# Patient Record
Sex: FEMALE | Race: WHITE | NOT HISPANIC OR LATINO | Employment: FULL TIME | URBAN - METROPOLITAN AREA
[De-identification: names, ages, dates, MRNs, and addresses within clinical notes are randomized per-mention and may not be internally consistent; named-entity substitution may affect disease eponyms.]

---

## 2023-03-23 RX ORDER — METHYLPREDNISOLONE 4 MG/1
TABLET ORAL
Refills: 0 | OUTPATIENT
Start: 2023-03-23

## 2023-04-28 PROBLEM — M41.9 SCOLIOSIS: Status: ACTIVE | Noted: 2023-04-28

## 2023-04-28 PROBLEM — G89.29 CHRONIC PAIN: Status: ACTIVE | Noted: 2023-04-28

## 2023-04-28 PROBLEM — F17.200 TOBACCO DEPENDENCE: Status: ACTIVE | Noted: 2023-04-28

## 2023-04-28 PROBLEM — M54.16 LUMBAR RADICULOPATHY: Status: ACTIVE | Noted: 2023-04-28

## 2023-04-28 PROBLEM — J02.9 ACUTE PHARYNGITIS: Status: ACTIVE | Noted: 2023-04-28

## 2023-04-28 PROBLEM — G89.29 CHRONIC LOW BACK PAIN: Status: ACTIVE | Noted: 2023-04-28

## 2023-04-28 PROBLEM — F33.1 MODERATE EPISODE OF RECURRENT MAJOR DEPRESSIVE DISORDER (MULTI): Status: ACTIVE | Noted: 2023-04-28

## 2023-04-28 PROBLEM — F41.9 ANXIETY: Status: ACTIVE | Noted: 2023-04-28

## 2023-04-28 PROBLEM — K21.9 GERD (GASTROESOPHAGEAL REFLUX DISEASE): Status: ACTIVE | Noted: 2023-04-28

## 2023-04-28 PROBLEM — S32.2XXA FRACTURE OF COCCYX (MULTI): Status: ACTIVE | Noted: 2023-04-28

## 2023-04-28 PROBLEM — M54.50 CHRONIC LOW BACK PAIN: Status: ACTIVE | Noted: 2023-04-28

## 2023-04-28 PROBLEM — N91.5 OLIGOMENORRHEA: Status: ACTIVE | Noted: 2023-04-28

## 2023-04-28 PROBLEM — S39.92XA TAILBONE INJURY: Status: ACTIVE | Noted: 2023-04-28

## 2023-04-28 PROBLEM — M54.2 NECK PAIN: Status: ACTIVE | Noted: 2023-04-28

## 2023-04-28 PROBLEM — M25.551 RIGHT HIP PAIN: Status: ACTIVE | Noted: 2023-04-28

## 2023-04-28 PROBLEM — M54.9 BACK PAIN: Status: ACTIVE | Noted: 2023-04-28

## 2023-04-28 PROBLEM — N39.3 STRESS INCONTINENCE, FEMALE: Status: ACTIVE | Noted: 2023-04-28

## 2023-04-28 PROBLEM — R87.810 ASCUS WITH POSITIVE HIGH RISK HPV CERVICAL: Status: ACTIVE | Noted: 2023-04-28

## 2023-04-28 PROBLEM — N89.8 VAGINAL DISCHARGE: Status: ACTIVE | Noted: 2023-04-28

## 2023-04-28 PROBLEM — M53.3 COCCYDYNIA: Status: ACTIVE | Noted: 2023-04-28

## 2023-04-28 PROBLEM — S32.10XD CLOSED FRACTURE OF SACRUM WITH ROUTINE HEALING: Status: ACTIVE | Noted: 2023-04-28

## 2023-04-28 PROBLEM — R87.610 ASCUS WITH POSITIVE HIGH RISK HPV CERVICAL: Status: ACTIVE | Noted: 2023-04-28

## 2023-04-28 PROBLEM — E28.2 PCOS (POLYCYSTIC OVARIAN SYNDROME): Status: ACTIVE | Noted: 2023-04-28

## 2023-04-28 PROBLEM — M47.816 LUMBAR SPONDYLOSIS: Status: ACTIVE | Noted: 2023-04-28

## 2023-04-28 RX ORDER — IBUPROFEN 800 MG/1
TABLET ORAL
COMMUNITY
Start: 2022-07-11 | End: 2023-11-30 | Stop reason: SDUPTHER

## 2023-04-28 RX ORDER — VALACYCLOVIR HYDROCHLORIDE 500 MG/1
TABLET, FILM COATED ORAL
COMMUNITY
Start: 2016-11-14 | End: 2023-10-04

## 2023-04-28 RX ORDER — AZITHROMYCIN 250 MG/1
TABLET, FILM COATED ORAL
COMMUNITY
Start: 2022-11-14 | End: 2023-11-30 | Stop reason: ALTCHOICE

## 2023-04-28 RX ORDER — GABAPENTIN 100 MG/1
1 CAPSULE ORAL 3 TIMES DAILY
COMMUNITY
Start: 2022-06-20 | End: 2023-11-30 | Stop reason: ALTCHOICE

## 2023-04-28 RX ORDER — ACYCLOVIR 800 MG/1
TABLET ORAL
COMMUNITY
Start: 2022-07-11 | End: 2023-11-30 | Stop reason: ALTCHOICE

## 2023-04-28 RX ORDER — FLUCONAZOLE 150 MG/1
TABLET ORAL
COMMUNITY
Start: 2023-02-02 | End: 2023-11-30 | Stop reason: ALTCHOICE

## 2023-05-03 ENCOUNTER — APPOINTMENT (OUTPATIENT)
Dept: PRIMARY CARE | Facility: CLINIC | Age: 33
End: 2023-05-03

## 2023-05-10 ENCOUNTER — APPOINTMENT (OUTPATIENT)
Dept: PRIMARY CARE | Facility: CLINIC | Age: 33
End: 2023-05-10

## 2023-07-05 ENCOUNTER — APPOINTMENT (OUTPATIENT)
Dept: PRIMARY CARE | Facility: CLINIC | Age: 33
End: 2023-07-05

## 2023-10-19 ENCOUNTER — TELEPHONE (OUTPATIENT)
Dept: PRIMARY CARE | Facility: CLINIC | Age: 33
End: 2023-10-19

## 2023-11-02 ENCOUNTER — APPOINTMENT (OUTPATIENT)
Dept: OBSTETRICS AND GYNECOLOGY | Facility: CLINIC | Age: 33
End: 2023-11-02
Payer: COMMERCIAL

## 2023-11-09 ENCOUNTER — APPOINTMENT (OUTPATIENT)
Dept: PRIMARY CARE | Facility: CLINIC | Age: 33
End: 2023-11-09
Payer: COMMERCIAL

## 2023-11-30 ENCOUNTER — OFFICE VISIT (OUTPATIENT)
Dept: PRIMARY CARE | Facility: CLINIC | Age: 33
End: 2023-11-30
Payer: COMMERCIAL

## 2023-11-30 ENCOUNTER — HOSPITAL ENCOUNTER (OUTPATIENT)
Dept: RADIOLOGY | Facility: HOSPITAL | Age: 33
Discharge: HOME | End: 2023-11-30
Payer: COMMERCIAL

## 2023-11-30 VITALS
HEIGHT: 65 IN | SYSTOLIC BLOOD PRESSURE: 124 MMHG | HEART RATE: 104 BPM | BODY MASS INDEX: 36.02 KG/M2 | TEMPERATURE: 97.9 F | WEIGHT: 216.2 LBS | DIASTOLIC BLOOD PRESSURE: 60 MMHG | OXYGEN SATURATION: 96 %
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
## 2023-11-30 DIAGNOSIS — E28.2 PCOS (POLYCYSTIC OVARIAN SYNDROME): ICD-10-CM

## 2023-11-30 DIAGNOSIS — M54.50 ACUTE BILATERAL LOW BACK PAIN WITHOUT SCIATICA: ICD-10-CM

## 2023-11-30 DIAGNOSIS — S39.92XS INJURY OF COCCYX, SEQUELA: ICD-10-CM

## 2023-11-30 DIAGNOSIS — M54.50 CHRONIC BILATERAL LOW BACK PAIN WITHOUT SCIATICA: ICD-10-CM

## 2023-11-30 DIAGNOSIS — G89.29 CHRONIC BILATERAL LOW BACK PAIN WITHOUT SCIATICA: ICD-10-CM

## 2023-11-30 DIAGNOSIS — Z00.00 ANNUAL PHYSICAL EXAM: ICD-10-CM

## 2023-11-30 DIAGNOSIS — F32.81 PMDD (PREMENSTRUAL DYSPHORIC DISORDER): ICD-10-CM

## 2023-11-30 DIAGNOSIS — S32.2XXS CLOSED FRACTURE OF COCCYX, SEQUELA: ICD-10-CM

## 2023-11-30 DIAGNOSIS — Z00.00 ANNUAL PHYSICAL EXAM: Primary | ICD-10-CM

## 2023-11-30 PROBLEM — N87.9 CERVICAL ATYPISM: Status: ACTIVE | Noted: 2023-04-28

## 2023-11-30 PROCEDURE — 36415 COLL VENOUS BLD VENIPUNCTURE: CPT

## 2023-11-30 PROCEDURE — 73521 X-RAY EXAM HIPS BI 2 VIEWS: CPT | Performed by: RADIOLOGY

## 2023-11-30 PROCEDURE — 82627 DEHYDROEPIANDROSTERONE: CPT

## 2023-11-30 PROCEDURE — 82306 VITAMIN D 25 HYDROXY: CPT

## 2023-11-30 PROCEDURE — 83036 HEMOGLOBIN GLYCOSYLATED A1C: CPT

## 2023-11-30 PROCEDURE — 72220 X-RAY EXAM SACRUM TAILBONE: CPT | Performed by: RADIOLOGY

## 2023-11-30 PROCEDURE — 84402 ASSAY OF FREE TESTOSTERONE: CPT

## 2023-11-30 PROCEDURE — 72100 X-RAY EXAM L-S SPINE 2/3 VWS: CPT | Performed by: RADIOLOGY

## 2023-11-30 PROCEDURE — 72220 X-RAY EXAM SACRUM TAILBONE: CPT

## 2023-11-30 PROCEDURE — 73521 X-RAY EXAM HIPS BI 2 VIEWS: CPT

## 2023-11-30 PROCEDURE — 84443 ASSAY THYROID STIM HORMONE: CPT

## 2023-11-30 PROCEDURE — 72100 X-RAY EXAM L-S SPINE 2/3 VWS: CPT

## 2023-11-30 PROCEDURE — 85027 COMPLETE CBC AUTOMATED: CPT

## 2023-11-30 PROCEDURE — 80061 LIPID PANEL: CPT

## 2023-11-30 PROCEDURE — 80053 COMPREHEN METABOLIC PANEL: CPT

## 2023-11-30 PROCEDURE — 99395 PREV VISIT EST AGE 18-39: CPT | Performed by: NURSE PRACTITIONER

## 2023-11-30 RX ORDER — HYDROXYZINE HYDROCHLORIDE 25 MG/1
TABLET, FILM COATED ORAL
COMMUNITY
Start: 2023-06-28 | End: 2023-11-30 | Stop reason: ALTCHOICE

## 2023-11-30 RX ORDER — CYCLOBENZAPRINE HCL 5 MG
TABLET ORAL
COMMUNITY
Start: 2023-06-28

## 2023-11-30 RX ORDER — ALBUTEROL SULFATE 90 UG/1
AEROSOL, METERED RESPIRATORY (INHALATION)
COMMUNITY
Start: 2023-06-28

## 2023-11-30 RX ORDER — IBUPROFEN 800 MG/1
TABLET ORAL
Qty: 21 TABLET | Refills: 0 | Status: SHIPPED | OUTPATIENT
Start: 2023-11-30

## 2023-11-30 RX ORDER — ETODOLAC 300 MG/1
CAPSULE ORAL
COMMUNITY
Start: 2023-06-28 | End: 2023-11-30 | Stop reason: ALTCHOICE

## 2023-11-30 ASSESSMENT — ENCOUNTER SYMPTOMS
NAUSEA: 0
ENDOCRINE NEGATIVE: 1
HEMATOLOGIC/LYMPHATIC NEGATIVE: 1
PALPITATIONS: 0
ABDOMINAL PAIN: 0
DIARRHEA: 0
DYSURIA: 0
ALLERGIC/IMMUNOLOGIC NEGATIVE: 1
VOMITING: 0
FREQUENCY: 0
CHILLS: 0
CONSTIPATION: 0
SHORTNESS OF BREATH: 0
WOUND: 0
FEVER: 0

## 2023-11-30 ASSESSMENT — PAIN SCALES - GENERAL: PAINLEVEL: 10-WORST PAIN EVER

## 2023-11-30 NOTE — PROGRESS NOTES
I was present with the APRN student who participated in the documentation of this note. I have personally seen and re-examined the patient and performed the medical decision-making components (assessment and plan of care). I have reviewed the APRN student documentation and verified the findings in the note as written with additions or exceptions as stated in the body of this note  Anneliese De Oliveira [facility]

## 2023-11-30 NOTE — PROGRESS NOTES
"Subjective   Patient ID: Ashley Berardinelli is a 33 y.o. female who presents for Annual Exam.    HPI     Pleasant 33 y.o. female and established patient presents for annual exam. Current concerns today include back pain and premenstrual dysphoric disorder.    Back pain - Slipped and fell in the shower on Tuesday night. Twisted her body the wrong way during fall. Has been experiencing \"excruciating\" back pain since and can \"barely move\". Describes as sore and rated 10/10. The pain is located across her entire back with radiation to bilateral hips. She's tried flexeril, ibuprofen, ice, heat, and gentle stretching with no relief. She had a previous fall in February 2022 where she fell on her coccyx. Per physical exam, positive back tenderness and decreased range of motion noted. Reports bilateral upper and lower extremity numbness and tingling. Denies loss of bowel or bladder function or weakness. Ibuprofen 800 mg prescribed for pain. X-rays bilateral hip, lumbar spine, and sacrum/coccyx ordered. Physical therapy ordered to strengthen muscles. Continue gentle stretching and rest. Follow-up in two weeks.    Premenstrual dysphoric disorder -  experiences depression along with feeling angry a week before her period. States she \"doesn't feel like herself\". Once she has her period her symptoms go away. Has a past medical history of anxiety and depression. Previously took lexapro, wellbutrin, and prozac. Denies SI or HI. Referral to psychiatry placed.    PCOS - states she was diagnosed for PCOS by her GYN 9/2022. Not on medication. Denies irregular menses. Testosterone level, HgA1c and DHEA-sulfate ordered.    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        SHIFT IN VAGINAL AUDELIA SUGGESTIVE OF BACTERIAL VAGINOSIS.     Family Hx  Mother - anxiety/depression, bipolar disorder  Father - CAD, type 2 diabetes  Grandmother - depression, bipolar disorder  Sister - depression    Review of Systems   Constitutional:  Negative for chills and fever.   HENT:  Negative for hearing loss.    Eyes:  Negative for visual disturbance.   Respiratory:  Negative for shortness of breath.    Cardiovascular:  Negative for chest pain and palpitations.   Gastrointestinal:  Negative for abdominal pain, constipation, diarrhea, nausea and vomiting.   Endocrine: Negative.    Genitourinary:  Negative for dysuria, frequency and urgency.   Musculoskeletal:         HPI   Skin:  Negative for rash and wound.   Allergic/Immunologic: Negative.    Neurological:         HPI   Hematological: Negative.    Psychiatric/Behavioral:          HPI     Objective   /60 (BP Location: Left arm, Patient Position: Sitting, BP Cuff Size: Large adult)   Pulse 104   Temp 36.6 °C (97.9 °F) (Temporal)   Ht 1.638 m (5' 4.5\")   Wt 98.1 kg (216 lb 3.2 oz)   LMP 11/30/2023 (Exact Date)   SpO2 96%   BMI 36.54 kg/m²     Physical Exam  Constitutional:       Appearance: Normal appearance.   HENT:      Head: Normocephalic and atraumatic.      Right Ear: Tympanic membrane, ear canal and external ear normal.      Left Ear: Tympanic membrane, ear canal and external ear normal.      Nose: Nose normal.      Mouth/Throat:      Mouth: Mucous membranes are dry.      Pharynx: Oropharynx is clear.   Eyes:      Extraocular Movements: Extraocular movements intact.      Pupils: Pupils are equal, round, and reactive to light.   Cardiovascular:      Rate and Rhythm: Normal rate and regular rhythm.      Pulses: Normal pulses.      Heart sounds: Normal heart sounds.   Pulmonary:      Effort: Pulmonary effort is normal.      Comments: Rhonchi RLL  Abdominal:      General: Abdomen is flat. " Bowel sounds are normal.      Palpations: Abdomen is soft.   Musculoskeletal:      Cervical back: Normal range of motion and neck supple.      Lumbar back: Tenderness present. Decreased range of motion.   Skin:     General: Skin is warm and dry.   Neurological:      General: No focal deficit present.      Mental Status: She is alert and oriented to person, place, and time.   Psychiatric:         Mood and Affect: Mood normal.         Assessment/Plan   Problem List Items Addressed This Visit             ICD-10-CM    Annual physical exam - Primary Z00.00    Relevant Orders    CBC    Comprehensive Metabolic Panel    Vitamin D 25-Hydroxy,Total (for eval of Vitamin D levels)    TSH with reflex to Free T4 if abnormal    Hemoglobin A1C    Lipid Panel    XR lumbar spine 2-3 views    XR sacrum coccyx 2+ views    Back pain M54.9     Continue gentle stretching and rest         Relevant Medications     mg tablet    Other Relevant Orders    XR lumbar spine 2-3 views    XR sacrum coccyx 2+ views    Referral to Physical Therapy    Fall at home W19.XXXA, Y92.009    Relevant Medications     mg tablet    Other Relevant Orders    XR lumbar spine 2-3 views    XR sacrum coccyx 2+ views    Fracture of coccyx (CMS/HCC) S32.2XXA    Relevant Orders    XR sacrum coccyx 2+ views    PCOS (polycystic ovarian syndrome) E28.2    Relevant Orders    Testosterone, total and free    DHEA-Sulfate    Tailbone injury S39.92XA    Relevant Orders    XR sacrum coccyx 2+ views     Other Visit Diagnoses         Codes    PMDD (premenstrual dysphoric disorder)     F32.81    Relevant Orders    Referral to Psychiatry

## 2023-11-30 NOTE — LETTER
November 30, 2023     Patient: Ashley Berardinelli   YOB: 1990   Date of Visit: 11/30/2023       To Whom It May Concern:    Ashley Berardinelli was seen in my clinic on 11/30/2023 at 1:30 pm. Please excuse Gean for her absence from work for today 11/30/23 and 12/01/2023.    If you have any questions or concerns, please don't hesitate to call.         Sincerely,         Anneliese De Oliveira, LAKHWINDER-CNP        CC: No Recipients

## 2023-11-30 NOTE — LETTER
November 30, 2023     Patient: Ashley Berardinelli   YOB: 1990   Date of Visit: 11/30/2023       To Whom It May Concern:    Ashley Berardinelli was seen in my clinic on 11/30/2023 at 1:30 pm. Please excuse Gena for her absence from work on this day to make the appointment.    If you have any questions or concerns, please don't hesitate to call.         Sincerely,         Anneliese De Oliveira, APRN-CNP        CC: No Recipients

## 2023-11-30 NOTE — PATIENT INSTRUCTIONS
PT referral  BH referral    A prescription was sent to your pharmacy. Your pharmacist can answer any questions or concerns you may have or you may call the office.    Health Tips      Develop good health habits now  Don't put it off. With good health habits, you can prevent or reduce the likelihood of health-impacting conditions later in life, such as obesity, high blood pressure, heart disease, or diabetes. Establishing good health habits now is easier than having to begin these practices later on, or to have to break bad habits.      Establish a relationship with a primary care provider   A PCP can help you on your health journey. When you see a provider on a regular basis, you're more likely to feel comfortable about talking about sensitive issues as well as being receptive to the advice your provider offers, because you develop a trusting relationship. And by getting to know you over time, your doctor may be better able to  on signs of health concerns.      Know your family health history   Knowing your family's health history can help you and your primary care provider better manage your health - and be aware of potential hereditary risks to be watching for.  Things like migraines or even family members with certain cancers and heart attack can increase your risk.       Get regular health screenings and Keep up with immunizations. This includes the HPV vaccine, for men and women.   For women: Monthly Self breast exams, See Gynecology for a Pap smear; HPV screening for the virus that causes genital warts, which can lead to cervical cancer   For men: Monthly Self testicular exams.   For both: sexually transmitted disease testing, if sexually active. Remember to use birth control to prevent and to protect. Talk with your health care provider about the screening schedule that's best for you.    Have good for you people in your life  Its all about a few good friends over a lot of not so good friends. If you  are close to your family stay that way, if not then develop new relationships that help you to grow and thrive. Often people make friends at work, Taoism, community groups  Developing and maintaining a work-life balance that allows room for friendships and relationships can have a positive impact on your mental and emotional health.     Be a numbers person  Keep tabs on numbers that affect your health, like weight, blood pressure, the amount of calories you consume, and cholesterol. Your health care provider can help you with this.      Pay attention to the risk of a few extra pounds.  If you gain four or five pounds every year, it doesn't seem like a lot, but at the end of 10 years, you've added 40 or 50 pounds - and in 15 to 20 years, you have 75 to 100 extra pounds that you're carrying!  *Choose a life of healthful eating over trendy diets. The more effective approach: adopt a life-time practice of eating a balanced, nutritional diet that includes vegetables, fruits, lean meats, whole grains, low-fat dairy, nuts and legumes, and non-tropical vegetable oils. And limit sweets.   *Practice portion control. There's more to healthy eating than choosing nutritious food. There's also limiting how much you eat, even if you're eating incredibly healthy food *Remember, your metabolic rate slows as you age. That is, your body becomes less efficient in burning calories.     Stay active   If you're exercising on a regular basis, that is going to help with a lot of health problems that are related to lifestyle.  You don't have to be an athlete, start with a walking program. Even 10 minutes of good exercise is beneficial. Don't forget weight training. Any Weights 3 days a week maintains bone health and helps to burn calories    Get enough sleep  For most that means seven to nine hours a night.   Sleep affects your ability to learn new information and to memorize and process information. Reaction time is adversely affected by  too little sleep (a big safety risk similar to drinking alcohol). In the long run, sleep makes a big difference in how you function and succeed     Mood impacts your overall health  People who are struggling with depression, anxiety, and self-esteem issues really have a lot of difficulty with their health. When depressed, you may not be motivated and may not see the value of taking care of your health. Self Care, Exercise and friendships can help reduce your risk of mental and emotional health issues, and when you need it, your health care provider can help you get professional help.      Don't vape  Vaping is a big problem ,it's not just flavored water, nicotine comes from tobacco so you are in essence still smoking. Also, we don't know about the effects of what's in vaping cartridges, and sometimes they're modified with substances that can cause lung failure. Cigarettes are even worse with known cancer causing chemicals  2 ml of vape juice is equal to 1 pack of cigarettes    Think twice about marijuana  Even in states where marijuana is legal (medically or recreationally) it doesn't mean your employer is going to think it's OK.   Like alcohol, marijuana affects your reasoning, decision-making, and ability to operate equipment safely
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
## 2023-12-01 DIAGNOSIS — R79.89 ELEVATED TESTOSTERONE LEVEL IN FEMALE: ICD-10-CM

## 2023-12-01 DIAGNOSIS — M54.50 ACUTE BILATERAL LOW BACK PAIN WITHOUT SCIATICA: ICD-10-CM
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
## 2023-12-01 LAB
25(OH)D3 SERPL-MCNC: 32 NG/ML (ref 30–100)
ALBUMIN SERPL BCP-MCNC: 4.2 G/DL (ref 3.4–5)
ALP SERPL-CCNC: 73 U/L (ref 33–110)
ALT SERPL W P-5'-P-CCNC: 20 U/L (ref 7–45)
ANION GAP SERPL CALC-SCNC: 13 MMOL/L (ref 10–20)
AST SERPL W P-5'-P-CCNC: 14 U/L (ref 9–39)
BILIRUB SERPL-MCNC: 0.3 MG/DL (ref 0–1.2)
BUN SERPL-MCNC: 9 MG/DL (ref 6–23)
CALCIUM SERPL-MCNC: 9.3 MG/DL (ref 8.6–10.6)
CHLORIDE SERPL-SCNC: 106 MMOL/L (ref 98–107)
CHOLEST SERPL-MCNC: 163 MG/DL (ref 0–199)
CHOLESTEROL/HDL RATIO: 2.4
CO2 SERPL-SCNC: 25 MMOL/L (ref 21–32)
CREAT SERPL-MCNC: 0.78 MG/DL (ref 0.5–1.05)
DHEA-S SERPL-MCNC: 240 UG/DL (ref 12–379)
ERYTHROCYTE [DISTWIDTH] IN BLOOD BY AUTOMATED COUNT: 13.2 % (ref 11.5–14.5)
EST. AVERAGE GLUCOSE BLD GHB EST-MCNC: 105 MG/DL
GFR SERPL CREATININE-BSD FRML MDRD: >90 ML/MIN/1.73M*2
GLUCOSE SERPL-MCNC: 101 MG/DL (ref 74–99)
HBA1C MFR BLD: 5.3 %
HCT VFR BLD AUTO: 39.3 % (ref 36–46)
HDLC SERPL-MCNC: 67 MG/DL
HGB BLD-MCNC: 12.6 G/DL (ref 12–16)
LDLC SERPL CALC-MCNC: 84 MG/DL
MCH RBC QN AUTO: 29.8 PG (ref 26–34)
MCHC RBC AUTO-ENTMCNC: 32.1 G/DL (ref 32–36)
MCV RBC AUTO: 93 FL (ref 80–100)
NON HDL CHOLESTEROL: 96 MG/DL (ref 0–149)
NRBC BLD-RTO: 0 /100 WBCS (ref 0–0)
PLATELET # BLD AUTO: 295 X10*3/UL (ref 150–450)
POTASSIUM SERPL-SCNC: 3.9 MMOL/L (ref 3.5–5.3)
PROT SERPL-MCNC: 6.5 G/DL (ref 6.4–8.2)
RBC # BLD AUTO: 4.23 X10*6/UL (ref 4–5.2)
SODIUM SERPL-SCNC: 140 MMOL/L (ref 136–145)
TRIGL SERPL-MCNC: 58 MG/DL (ref 0–149)
TSH SERPL-ACNC: 0.56 MIU/L (ref 0.44–3.98)
VLDL: 12 MG/DL (ref 0–40)
WBC # BLD AUTO: 7.1 X10*3/UL (ref 4.4–11.3)

## 2023-12-01 RX ORDER — METHYLPREDNISOLONE 4 MG/1
TABLET ORAL
Qty: 21 TABLET | Refills: 0 | Status: SHIPPED | OUTPATIENT
Start: 2023-12-01 | End: 2023-12-08

## 2023-12-04 PROBLEM — R79.89 ELEVATED TESTOSTERONE LEVEL IN FEMALE: Status: ACTIVE | Noted: 2023-12-04

## 2023-12-04 LAB
TESTOSTERONE FREE (CHAN): 6.7 PG/ML (ref 0.1–6.4)
TESTOSTERONE,TOTAL,LC-MS/MS: 45 NG/DL (ref 2–45)

## 2023-12-05 ENCOUNTER — APPOINTMENT (OUTPATIENT)
Dept: OBSTETRICS AND GYNECOLOGY | Facility: CLINIC | Age: 33
End: 2023-12-05
Payer: COMMERCIAL

## 2023-12-07 ENCOUNTER — OFFICE VISIT (OUTPATIENT)
Dept: NEUROSURGERY | Facility: CLINIC | Age: 33
End: 2023-12-07
Payer: COMMERCIAL

## 2023-12-07 VITALS
WEIGHT: 215 LBS | HEIGHT: 64 IN | HEART RATE: 92 BPM | DIASTOLIC BLOOD PRESSURE: 93 MMHG | BODY MASS INDEX: 36.7 KG/M2 | RESPIRATION RATE: 20 BRPM | SYSTOLIC BLOOD PRESSURE: 139 MMHG

## 2023-12-07 DIAGNOSIS — M54.50 ACUTE BILATERAL LOW BACK PAIN, UNSPECIFIED WHETHER SCIATICA PRESENT: ICD-10-CM

## 2023-12-07 PROCEDURE — 99203 OFFICE O/P NEW LOW 30 MIN: CPT | Performed by: NEUROLOGICAL SURGERY

## 2023-12-07 PROCEDURE — 99213 OFFICE O/P EST LOW 20 MIN: CPT | Performed by: NEUROLOGICAL SURGERY

## 2023-12-07 RX ORDER — CYCLOBENZAPRINE HCL 10 MG
10 TABLET ORAL 2 TIMES DAILY PRN
Qty: 60 TABLET | Refills: 0 | Status: SHIPPED | OUTPATIENT
Start: 2023-12-07 | End: 2024-01-06

## 2023-12-07 ASSESSMENT — PAIN SCALES - GENERAL: PAINLEVEL: 10-WORST PAIN EVER

## 2023-12-07 NOTE — PROGRESS NOTES
It was a pleasure to see Ms. Berardinelli at the Neurosurgery [facility] at [facility].     She is a really nice 33 y.o. female  who presents to us with complaints LOW BACK PAIN radiating to RIGHT LEG and left leg that started about  1 week ago, and have been rapidly worsening since that time.  The symptoms started after a fall    The pain is 10 /10. The pain is described as aching, burning, and stabbing and occurs all day.  Symptoms are exacerbated by nothing in particular. Factors which relieve the pain include  lying down       Numbness and/or tingling - YES    Weakness : YES    Bladder/Bowel symptoms - NO    The patient has tried medications (eg: gabapentin, NSAIDS and narcotics ) : Yes  PT : No  Pain Management with ESIs/selective nerve blocks  - NO    she is a is a smoker    History of Depression : YES    PRIOR SPINE SURGERY: NO    Denies use of Aspirin, Coumadin, or Plavix or any other anticoagulants     NARCOTICS for pain : NO    Part of this patient’s history is from personal review of the patient’s previous charts.      Past Medical History:   Diagnosis Date    Difficulty in walking, not elsewhere classified 11/16/2020    Difficulty walking    Lumbago with sciatica, unspecified side 11/16/2020    Lumbago with sciatica    Obesity, unspecified 12/15/2013    Obesity    Personal history of other diseases of the nervous system and sense organs     History of chronic fatigue syndrome    Personal history of other infectious and parasitic diseases 07/05/2017    History of herpes zoster    Personal history of other specified conditions 07/21/2020    History of syncope           Current Outpatient Medications:     cyclobenzaprine (Flexeril) 5 mg tablet, Take by mouth., Disp: , Rfl:      mg tablet, TAKE ONE TABLET BY MOUTH THREE TIMES DAILY FOR 7 DAYS, Disp: 21 tablet, Rfl: 0    methylPREDNISolone (Medrol Dospak) 4 mg tablets, Take as directed on package., Disp: 21 tablet, Rfl: 0    valACYclovir  (Valtrex) 500 mg tablet, TAKE 1 TABLET BY MOUTH TWICE DAILY FOR 3 DAYS FOR RECURRENT INFECTION. START AS SOON AS POSSIBLE WITHIN 48 TO 72 HOURS OF ONSET OF SYMPTOMS, Disp: 30 tablet, Rfl: 0    Ventolin HFA 90 mcg/actuation inhaler, Inhale., Disp: , Rfl:       Review of Systems :   Constitutional: No fever or chills  Respiratory: No shortness of breath or wheezing  Musculoskeletal: see HPI above   Neurologic: See HPI above      EXAM:   There were no vitals filed for this visit.  NEURO: Neurologically patient is awake alert and oriented X 3    No obvious cranial nerve deficit.  Strength is almost 5/5 throughout all motor groups tested with no asymmetric significant motor deficit.  Deep tendon reflexes are symmetric throughout.   Gait : WNL   Sensory examination is intact to touch and painful stimuli.  Range of motion is diminished secondary to pain.    IMAGING:   AP and lateral x-ray of the lumbar spine done on 11/30/2023 were reviewed personally and shows no evidence of any fracture or subluxation.  MRI of the lumbar spine done in 2022 was also reviewed and demonstrates presence of age related degenerative changes with NO significant focal central or foraminal stenosis leading to nerve root compression.      ASSESSMENT AND PLAN:  Ms. Berardinelli has not tried any conservative treatment yet.   Her neurological examination is completely benign with absence of any focal neurological deficits.  The duration of symptoms < 6 weeks and the patient does NOT have any symptoms or history of severe or progressive neurological deficit and there is no urgent indication to obtain any advanced imaging.  In fact she had an MRI in June 2022 that did not showed any obvious pathology that would require any kind of surgery.  I reassured her that her symptoms seems to be musculoskeletal secondary to the fall.  I have given her a referral for PT and also a prescription for muscle relaxants that may help her with her symptoms.  She can  continue NSAIDs.     I will be happy to see her back if she continues to be symptomatic despite all conservative treatment to obtain advanced imaging.      Galo Kruger MD, [facility]   of Neurological Surgery  [employer]  Attending Surgeon  Director - Minimally Invasive Spine Surgery  [place], [place]      Some of this note was completed using Dragon voice recognition technology and sometimes the software misinterprets words. This may include unintended errors with respect to translation of words, typographical errors or grammar errors which may not have been identified prior to finalization of the chart note. Please take this into account when reading this note

## 2023-12-08 ENCOUNTER — EVALUATION (OUTPATIENT)
Dept: PHYSICAL THERAPY | Facility: CLINIC | Age: 33
End: 2023-12-08
Payer: COMMERCIAL

## 2023-12-08 DIAGNOSIS — G89.29 CHRONIC BILATERAL LOW BACK PAIN WITHOUT SCIATICA: ICD-10-CM

## 2023-12-08 DIAGNOSIS — M54.50 CHRONIC BILATERAL LOW BACK PAIN WITHOUT SCIATICA: ICD-10-CM

## 2023-12-08 PROCEDURE — 97161 PT EVAL LOW COMPLEX 20 MIN: CPT | Mod: GP | Performed by: INTERNAL MEDICINE

## 2023-12-08 NOTE — PROGRESS NOTES
Physical Therapy    Physical Therapy Evaluation and Treatment    Patient Name: Ashley Berardinelli  MRN: [id number]  Today's Date: 12/8/2023  Time Calculation  Start Time: 1428  Stop Time: 1503  Time Calculation (min): 35 min    Insurance reviewed   Visit number: 1  30 PT/OT V PCY; 0 used  No authorization required after evaluation  $ 25 copay     Referring Physician: Dr. Galo Kurger    Assessment:  PT Assessment Results: Decreased strength, Decreased range of motion, Decreased endurance, Decreased mobility, Pain  Rehab Prognosis: Good  Evaluation/Treatment Tolerance: Patient tolerated treatment well    Ashley Berardinelli  is a 33 y.o. old patient who participated in a physical therapy evaluation today due to LBP with radiating pain to B hips and groin. Her impairments include: postural deficits, tenderness, strength deficits, pain, and ROM deficits. Due to these impairments, she has the following functional limitations and participation restrictions: difficulty with sleeping (without medication), stair negotiation, transfers, performing household/recreational/occupational activities, and performing some ADLS. Skilled physical therapy services are appropriate and beneficial in order to achieve measurable and meaningful change in the above objective tests and measures. Utilization of skilled physical therapy services will aid in advancing her functional status and attaining her therapy-related goals. The patient verbalized understanding and is in agreement with all goals and plan of care.  Plan of care was developed with input and agreement by the patient    Plan:  Treatment/Interventions: Aquatic therapy, Cryotherapy, Dry needling, Education/ Instruction, Electrical stimulation, Manual therapy, Mechanical traction, Neuromuscular re-education, Self care/ home management, Taping techniques, Therapeutic activities, Therapeutic exercises, Ultrasound  PT Plan: Skilled PT  PT Frequency: 1 time per week  Duration: 6  visits  Rehab Potential: Good  Plan of Care Agreement: Patient      Current Problem:  1. Chronic bilateral low back pain without sciatica  Referral to Physical Therapy        Subjective    Ashley Berardinelli  is a 33 y.o. female  presenting to the clinic with chief complaint of LBP starting about one year ago when she fell on her tail bone; completed therapy for 9 months with good relief and results. Recently, pt reports she twisted her back to the L after slipping when getting in to shower. Pt reports finishing steroids. Trying to stretch via seated reach and cat/cow. Pt had sciatica but became worse after fall    Ashley Berardinelli  reports symptoms began 11/30 when slipping and almost falling getting in to shower    Pain / Location: 7/10 low back, hip and B groin pain     Reports symptoms are better with laying supine and resting, muscle relaxer, ibuprofen    Reports symptoms are worse with prolonged sitting, standing    Radiating Pain:  Yes; to B hips and groin  Numbness/Tingling: No concerns    Functional Limitations:  personal care ADLs, lifting, walking, sitting, standing, sleeping, social life, traveling, employment/homemaking    CURRENT MEDICAL MANAGEMENT:    Medical History Form: Reviewed (scanned into chart)  -Imaging: XR lumbar spine 2-3 views, XR sacrum coccyx 2+ views, XR hips bilateral 2 VW w pelvis when performed 11/30/23: Sacroiliac joints normal without erosive change or ankylosis. Sacrum and coccyx normal without fracture or lesion. The lumbar spine demonstrates mild degenerative change at L3-4,  similar to prior exam. No fracture seen. Bilateral hip joints unremarkable.      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 Likes to dance and sing  Living Environment: [address] with HRs. Tub shower with towel bar  Sleep: No concerns  with muscle relaxer    Precautions:   Precautions  Precautions Comment: +Sciatica; not medically managed. +OA; not medically managed. (Low Fall Risk; educated increased risk based on hx)      Objective     Outcome Measures:  Other Measures  Oswestry Disablity Index (DMITRY): 60%    Observation: pain with proximal hip MMT. Leg length discrepancy with LLE longer than RLE  Posture: fair, forward head, rounded shoulders  Correction of posture:  sx's worse    Dermatomes/Sensation Testing:  (L2) Anterior Thigh:  intact  (L3) Medial Knee:  intact  (L4) Medial Malleolus:  intact  (L5) Dorsal Second Toe Web Space: intact  (S1) Lateral Malleolus:  intact    Myotomes/Strength Testing (MMT in sitting):  (L2) Hip Flex (R/L):  4/5 p!, 4+/5 p! (Pain R > L)   (L3) Knee Ext (R/L): 5/5, 5/5  Knee Flex (R/L): 5/5, 5/5  (L4) Ankle DF (R/L): 4+/5, 4+/5  (L5) GTE (R/L): 4+/5, 4+/5  (S1) Ankle PF (R/L):  5/5, 5/5    Hip Abd. (R/L): 4/5 p! , 4/5 p!  Hip Ext. (Observed with functional bridge): good lift of buttocks from mat table    Special Tests:  Slump R/L:  neg / neg  Active SLR R/L: neg/ neg    Lumbar ROM  FIS: mild limitation; sx's worse  EIS: mod to significant limitation; sx's worse (worse with extension)    Trunk SB R/L: min limitation / mod limitation   Trunk Rotation R/L: NT B due to mechanism of injury    Palpation: tenderness to upper, mid, and lower spinous processes and paraspinals, B greater trochanters      KEY  NT = not tested this visit  p! = pain  ~ = approximation          Treatments:  Eval/TX, HEP, Safety Education:   Pt was educated on HEP, anatomy and function, examination findings, POC, and goals of therapy. HEP was reviewed with pt this visit and included all exercises performed below. Printout of exercises were provided to pt at end of session with all questions and concerns answered prior to pt leaving today's evaluation.    Therapeutic Exercise x 5 minutes, 0 unit   Access Code: [email or web address]  - Pelvic Muscle Energy  Technique With Flexion and Extension  - 1 x daily - 7 x weekly - 1 sets - 10 reps - 10 second hold  - Supine Hip Adduction Isometric with Ball  - 1 x daily - 7 x weekly - 3 sets - 10 reps  - Supine Lower Trunk Rotation  - 1 x daily - 7 x weekly - 3 sets - 10 reps    NV: monitor response to pelvic MET, back school, progress proximal hip and core strength as dale    OP EDUCATION:   23 1400   Outpatient Education   Individual(s) Educated Patient   Education Provided Home Exercise Program;POC;Other   Patient/Caregiver Demonstrated Understanding yes   Plan of Care Discussed and Agreed Upon yes   Patient Response to Education Patient/Caregiver Verbalized Understanding of Information;Patient/Caregiver Performed Return Demonstration of Exercises/Activities;Patient/Caregiver Asked Appropriate Questions       Goals:  ST weeks  1. Patient independent with home exercise program to progress current functional status    LTGs: 6 weeks  1.  Improve Oswestry score to 0-19% impairment to indicate a significant improvement in overall lumbar perception of disability.  2.  Decrease complaints of pain by 80% at minimum of evaluation rating, 4 of 7 days a week at minimum.  3. Patient will demonstrate improvements in sitting and standing posture without cueing from therapist during 45 minute session to allow for improved tolerances for spinal loading.  4.  Patient will demonstrate appropriate and safe body mechanics with work related and/or clinical activities to manage pain and prevent further injury  5.  Increase LE strength by 0.5 to 1 manual testing grade to improve overall functional mobility for IADLS and postural control.    6.  Increase spine AROM to </= mild limitations without pain as appropriate in each plane, to improve functional mobility tolerances for IADLS.

## 2023-12-13 ENCOUNTER — DOCUMENTATION (OUTPATIENT)
Dept: PHYSICAL THERAPY | Facility: CLINIC | Age: 33
End: 2023-12-13
Payer: COMMERCIAL

## 2023-12-13 ENCOUNTER — APPOINTMENT (OUTPATIENT)
Dept: PHYSICAL THERAPY | Facility: CLINIC | Age: 33
End: 2023-12-13
Payer: COMMERCIAL

## 2023-12-13 NOTE — PROGRESS NOTES
Physical Therapy                    Therapy Communication Note    Patient Name: Ashley Berardinelli  MRN: [id number]  Today's Date: 12/13/2023     Discipline: Physical Therapy    Missed Visit Reason: Per chart review, pt canceled PT session this afternoon via MyChart    Missed Time: Cancel

## 2023-12-14 ENCOUNTER — APPOINTMENT (OUTPATIENT)
Dept: PRIMARY CARE | Facility: CLINIC | Age: 33
End: 2023-12-14
Payer: COMMERCIAL

## 2023-12-18 ENCOUNTER — DOCUMENTATION (OUTPATIENT)
Dept: PHYSICAL THERAPY | Facility: CLINIC | Age: 33
End: 2023-12-18
Payer: COMMERCIAL

## 2023-12-18 NOTE — PROGRESS NOTES
Physical Therapy                    Therapy Communication Note    Patient Name: Ashley Berardinelli  MRN: [id number]  Today's Date: 12/18/2023     Discipline: Physical Therapy    Missed Visit Reason: Pt no call, no show for PT session this morning. Called and left pt VM with reminder of next session on 01/04/24 and cancellation/no show policy. Pt 2/3 for cancellations/no shows at this time.    Missed Time: No Show

## 2023-12-26 ENCOUNTER — APPOINTMENT (OUTPATIENT)
Dept: OBSTETRICS AND GYNECOLOGY | Facility: CLINIC | Age: 33
End: 2023-12-26
Payer: COMMERCIAL

## 2024-01-02 ENCOUNTER — APPOINTMENT (OUTPATIENT)
Dept: PRIMARY CARE | Facility: CLINIC | Age: 34
End: 2024-01-02
Payer: COMMERCIAL

## 2024-01-04 ENCOUNTER — DOCUMENTATION (OUTPATIENT)
Dept: PHYSICAL THERAPY | Facility: CLINIC | Age: 34
End: 2024-01-04
Payer: COMMERCIAL

## 2024-01-04 NOTE — PROGRESS NOTES
Physical Therapy                 Therapy Communication Note    Patient Name: Ashley Berardinelli  MRN: [id number]  Today's Date: 1/4/2024     Discipline: Physical Therapy    Missed Visit Reason:      Missed Time: No Show    Comment: Discharge due to non compliance.  Pt was informed in our cancellation/no show policy on last no show 12/18/23.  Pt not seen in PT since 12/8/23 initial evaluation.

## 2024-01-11 ENCOUNTER — APPOINTMENT (OUTPATIENT)
Dept: PHYSICAL THERAPY | Facility: CLINIC | Age: 34
End: 2024-01-11
Payer: COMMERCIAL

## 2024-01-19 ENCOUNTER — APPOINTMENT (OUTPATIENT)
Dept: OBSTETRICS AND GYNECOLOGY | Facility: CLINIC | Age: 34
End: 2024-01-19
Payer: COMMERCIAL

## 2024-02-16 ENCOUNTER — APPOINTMENT (OUTPATIENT)
Dept: OBSTETRICS AND GYNECOLOGY | Facility: CLINIC | Age: 34
End: 2024-02-16
Payer: COMMERCIAL

## 2024-04-01 ENCOUNTER — LAB REQUISITION (OUTPATIENT)
Dept: LAB | Facility: HOSPITAL | Age: 34
End: 2024-04-01
Payer: COMMERCIAL

## 2024-04-01 DIAGNOSIS — U07.1 COVID-19: ICD-10-CM

## 2024-04-01 LAB — SARS-COV-2 RNA RESP QL NAA+PROBE: NOT DETECTED

## 2024-04-01 PROCEDURE — 87635 SARS-COV-2 COVID-19 AMP PRB: CPT

## 2024-04-09 ENCOUNTER — OFFICE VISIT (OUTPATIENT)
Dept: OBSTETRICS AND GYNECOLOGY | Facility: CLINIC | Age: 34
End: 2024-04-09
Payer: COMMERCIAL

## 2024-04-09 VITALS — BODY MASS INDEX: 36.65 KG/M2 | WEIGHT: 220 LBS | HEIGHT: 65 IN

## 2024-04-09 DIAGNOSIS — E28.2 PCOS (POLYCYSTIC OVARIAN SYNDROME): ICD-10-CM

## 2024-04-09 DIAGNOSIS — Z30.011 ENCOUNTER FOR INITIAL PRESCRIPTION OF CONTRACEPTIVE PILLS: ICD-10-CM

## 2024-04-09 DIAGNOSIS — R87.810 ASCUS WITH POSITIVE HIGH RISK HPV CERVICAL: Primary | ICD-10-CM

## 2024-04-09 DIAGNOSIS — Z01.419 WELL WOMAN EXAM: ICD-10-CM

## 2024-04-09 DIAGNOSIS — N94.3 PMS (PREMENSTRUAL SYNDROME): ICD-10-CM

## 2024-04-09 DIAGNOSIS — R87.610 ASCUS WITH POSITIVE HIGH RISK HPV CERVICAL: Primary | ICD-10-CM

## 2024-04-09 PROCEDURE — 87624 HPV HI-RISK TYP POOLED RSLT: CPT

## 2024-04-09 PROCEDURE — 99385 PREV VISIT NEW AGE 18-39: CPT | Performed by: OBSTETRICS & GYNECOLOGY

## 2024-04-09 PROCEDURE — 88141 CYTOPATH C/V INTERPRET: CPT | Performed by: PATHOLOGY

## 2024-04-09 PROCEDURE — 88175 CYTOPATH C/V AUTO FLUID REDO: CPT

## 2024-04-09 RX ORDER — DROSPIRENONE AND ETHINYL ESTRADIOL 0.02-3(28)
1 KIT ORAL DAILY
Qty: 84 TABLET | Refills: 0 | Status: SHIPPED | OUTPATIENT
Start: 2024-04-09 | End: 2025-04-09

## 2024-04-09 NOTE — PROGRESS NOTES
"Ashley Berardinelli is a 33 y.o. female who is here for a routine exam. 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  H/o PCOS, elevated testosterone  PMS/PMDD sx  acne    Menses : monthly  Contraception : other none  HPV vaccine : No  Last pap : 2022 ascus  Last HPV : 2022 +  History of abnormal pap : Yes, describe: colpo neg  Last mammogram : never  History of abnormal mammogram : No  Colon cancer screen : never    ROS  systems reviewed, anything negative noted in HPI    bladder: no dysuria, gross hematuria, urinary frequency, urgency or incontinence  breast: no lumps, nipple d/c, overlying skin changes, redness, or skin retraction    [unfilled]    Past med hx and past surg hx reviewed and notable for: PCOS    Objective   Ht 1.638 m (5' 4.5\")   Wt 99.8 kg (220 lb)   LMP 03/25/2024   BMI 37.18 kg/m²      General:   Alert and oriented, in no acute distress   Neck: Supple. No visible thyromegaly.    Breast/Axilla: Normal to palpation bilaterally without masses, skin changes, lymphadenopathy, or nipple discharge.    Abdomen: Soft, non-tender, without masses or organomegaly   Vulva: Normal architecture without erythema, masses, or lesions.    Vagina: Normal mucosa without lesions, masses, or atrophy. No abnormal vaginal discharge.    Cervix: Normal without masses, lesions, or signs of cervicitis.    Uterus: Normal mobile, non-enlarged uterus    Adnexa: Normal without masses or lesions   Pelvic Floor No POP noted.    Psych Normal affect. Normal mood.      Thank you for coming to your annual exam. Your findings during the exam were normal. Specific topics addressed during this exam included: healthy lifestyle, well woman screening guidelines,     Actions performed during this visit include:  - Clinical breast exam  - Clinical pelvic exam  - pap/hpv  - gianvi, use discussed  No orders of the defined types were placed in this encounter.      Follow " up 3 mo    Please return for your next visit in 1 year.

## 2024-04-23 LAB
CYTOLOGY CMNT CVX/VAG CYTO-IMP: NORMAL
HPV HR 12 DNA GENITAL QL NAA+PROBE: NEGATIVE
HPV HR GENOTYPES PNL CVX NAA+PROBE: NEGATIVE
HPV16 DNA SPEC QL NAA+PROBE: NEGATIVE
HPV18 DNA SPEC QL NAA+PROBE: NEGATIVE
LAB AP HPV GENOTYPE QUESTION: YES
LAB AP HPV HR: NORMAL
LAB AP PREVIOUS ABNORMAL HISTORY: NORMAL
LABORATORY COMMENT REPORT: NORMAL
LMP START DATE: NORMAL
PATH REPORT.TOTAL CANCER: NORMAL

## 2024-05-07 ENCOUNTER — APPOINTMENT (OUTPATIENT)
Dept: OBSTETRICS AND GYNECOLOGY | Facility: CLINIC | Age: 34
End: 2024-05-07
Payer: COMMERCIAL

## 2024-06-26 ENCOUNTER — CLINICAL SUPPORT (OUTPATIENT)
Dept: ORTHOPEDIC SURGERY | Facility: CLINIC | Age: 34
End: 2024-06-26
Payer: COMMERCIAL

## 2024-06-26 VITALS — WEIGHT: 220 LBS | HEIGHT: 65 IN | BODY MASS INDEX: 36.65 KG/M2

## 2024-06-26 DIAGNOSIS — S80.12XA CONTUSION OF LEFT LOWER LEG, INITIAL ENCOUNTER: ICD-10-CM

## 2024-06-26 DIAGNOSIS — R22.42 LOCALIZED SWELLING OF LEFT LOWER LEG: ICD-10-CM

## 2024-06-26 PROCEDURE — 99203 OFFICE O/P NEW LOW 30 MIN: CPT | Performed by: ORTHOPAEDIC SURGERY

## 2024-06-26 RX ORDER — TRAMADOL HYDROCHLORIDE 50 MG/1
50 TABLET ORAL EVERY 6 HOURS PRN
Qty: 20 TABLET | Refills: 0 | Status: SHIPPED | OUTPATIENT
Start: 2024-06-26

## 2024-06-26 NOTE — LETTER
July 1, 2024     Patient: Ashley Berardinelli   YOB: 1990   Date of Visit: 6/26/2024       To Whom It May Concern:    Ashley Berardinelli was seen in my clinic on 6/26/2024. Please excuse Gena from work due to left lower extremity injury. Return to work date is 7/1/2024.    If you have any questions or concerns, please don't hesitate to call.         Sincerely,       Michael Lopresti      [facility] [place]        CC: No Recipients

## 2024-06-26 NOTE — PROGRESS NOTES
Subjective    Patient ID: Ashley Berardinelli is a 34 y.o. female.    Chief Complaint: Injury of the Left Lower Leg (Fell on 6/21/2024/+pain and swelling in her lower leg)       This is a 34-year-old female who presents for evaluation of left leg pain the majority of which is described into her left shin and calf more so laterally than medially.  She directly relates this to an injury that occurred last Friday when she tripped and fell.  She did sustain an abrasion to her left anterior lateral calf region at the time.  She had significant pain and went to [facility] emergency room where she was evaluated and told x-rays were negative.  She was placed on crutches and sent home.  She describes a burning sensation along the anterolateral aspect of her calf.  Occasionally she does have some pain to her left thigh.  She has remained ambulatory and arrives today ambulating without the use of an assistive device.    This patient's past medical, social, and family history were reviewed as well as a review of systems including updates on the patient's information encounter sheet  Patient does suffer from depression and anxiety    Physical Examination  Constitutional: Patient's height and weight reviewed, appears well kempt, moderately obese with a BMI approaching 40  Psychiatric: Alert and oriented ×3, appropriate mood and behavior  Pulmonary: Breathing appears nonlabored, no apparent distress  Lymphatic: No appreciable lymphadenopathy to both the upper and lower extremities  Skin: No open lesions, rashes, ulcerations  Neurologic: Gross motor and sensory exam appear intact (except for abnormalities noted in the below muscle skeletal exam)    Musculoskeletal: There appears to be satisfactory range of motion left hip without groin or thigh pain elicited.  Hip flexion strength is intact.  Patient can actively extend the knee against gravity.  The left knee is stable to ligamentous examination and there is no knee  effusion identified.  There is abrasion along the anterior lateral aspect of the calf which is healing well without evidence of infection.  There is a prominence underlying the skin consistent with a hematoma and there is moderate to marked tenderness in this area.  She is neurologic intact of the left foot to motor function.    X-rays today of her left shin demonstrate alignment of the tibia and fibula are well-maintained and there is no evidence of an acute fracture    Assessment: Fall resulting in contusion of the left lower extremity with hematoma    Plan: A long discussion ensued with the patient guarding treatment options.  We suggest she remain out of work for at least the rest of this week.  We have suggested cold and hot contrast to the hematoma region to help with resorption.  We have suggested elevation of foot and ankle pumps.  Formal program of physical therapy to begin next week.  She may use tramadol on a as needed basis for pain at this time.          Current Outpatient Medications:     cyclobenzaprine (Flexeril) 10 mg tablet, Take 1 tablet (10 mg) by mouth 2 times a day as needed for muscle spasms., Disp: 60 tablet, Rfl: 0    cyclobenzaprine (Flexeril) 5 mg tablet, Take by mouth., Disp: , Rfl:     drospirenone-ethinyl estradioL (Aby, Gianvi) 3-0.02 mg tablet, Take 1 tablet by mouth once daily., Disp: 84 tablet, Rfl: 0     mg tablet, TAKE ONE TABLET BY MOUTH THREE TIMES DAILY FOR 7 DAYS, Disp: 21 tablet, Rfl: 0    valACYclovir (Valtrex) 500 mg tablet, TAKE 1 TABLET BY MOUTH TWICE DAILY FOR 3 DAYS FOR RECURRENT INFECTION. START AS SOON AS POSSIBLE WITHIN 48 TO 72 HOURS OF ONSET OF SYMPTOMS, Disp: 30 tablet, Rfl: 0    Ventolin HFA 90 mcg/actuation inhaler, Inhale., Disp: , Rfl:

## 2024-07-05 ENCOUNTER — APPOINTMENT (OUTPATIENT)
Dept: PRIMARY CARE | Facility: CLINIC | Age: 34
End: 2024-07-05
Payer: COMMERCIAL

## 2024-07-08 ENCOUNTER — APPOINTMENT (OUTPATIENT)
Dept: OBSTETRICS AND GYNECOLOGY | Facility: CLINIC | Age: 34
End: 2024-07-08
Payer: COMMERCIAL

## 2024-07-15 DIAGNOSIS — B34.8 OTHER VIRAL INFECTIONS OF UNSPECIFIED SITE: ICD-10-CM

## 2024-07-16 RX ORDER — VALACYCLOVIR HYDROCHLORIDE 500 MG/1
TABLET, FILM COATED ORAL
Qty: 30 TABLET | Refills: 0 | Status: SHIPPED | OUTPATIENT
Start: 2024-07-16

## 2024-07-18 ENCOUNTER — APPOINTMENT (OUTPATIENT)
Dept: SURGERY | Facility: CLINIC | Age: 34
End: 2024-07-18
Payer: COMMERCIAL

## 2024-07-18 DIAGNOSIS — K58.0 IRRITABLE BOWEL SYNDROME WITH DIARRHEA: ICD-10-CM

## 2024-07-18 DIAGNOSIS — K82.8 BILIARY DYSKINESIA: ICD-10-CM

## 2024-07-18 DIAGNOSIS — K21.00 GASTROESOPHAGEAL REFLUX DISEASE WITH ESOPHAGITIS WITHOUT HEMORRHAGE: Primary | ICD-10-CM

## 2024-07-18 PROCEDURE — 99214 OFFICE O/P EST MOD 30 MIN: CPT | Performed by: SURGERY

## 2024-07-18 NOTE — PROGRESS NOTES
Subjective   Patient ID: Ashley Berardinelli is a 34 y.o. female who presents for New Patient Visit and Cholelithiasis.  The patient complaints and the multitude of GI symptoms, included epigastric, right upper quadrant pain, diarrhea, bloating.  Severe GERD, poorly controlled with Pepcid.  Patient previously Assessment with HIDA scan, which show gallbladder dyskinesia with high ejection fraction of 94%.  I recommend to proceed with EGD.  Patient did not do it for personal reasons, now presented with worsening of the symptoms    HPI as described above  Review of Systems GI system consistent with abdominal pain, discomfort, bloating, dyspepsia.  Review of other 10 system is negative  Physical Exam pupils equal bilaterally, mucosa moist, bilateral breath sounds, clear to auscultation, regular rate, no murmurs abdomen soft, minimal diffuse tenderness without rebound, without guarding.  No palpable hernias.  Palpable peripheral pulse, no focal neurological motor deficits.  Musculoskeletal exam within normal limits, ENT exam within normal limits.    Objective ultrasound showed no evidence of cholelithiasis.  HIDA scan consistent with dyskinesia with ejection fraction of 94%.    No diagnosis found.   Patient Active Problem List   Diagnosis    Acute pharyngitis    Anxiety    ASCUS with positive high risk HPV cervical    Chronic low back pain    Chronic pain    Closed fracture of sacrum with routine healing    Back pain    Coccydynia    Fracture of coccyx (Multi)    Tailbone injury    GERD (gastroesophageal reflux disease)    Lumbar radiculopathy    Lumbar spondylosis    Moderate episode of recurrent major depressive disorder (Multi)    Neck pain    Oligomenorrhea    PCOS (polycystic ovarian syndrome)    Right hip pain    Scoliosis    Stress incontinence, female    Tobacco dependence    Vaginal discharge    Cervical atypism    Myopia    Fall at home    Annual physical exam    Elevated testosterone level in female       Allergies   Allergen Reactions    Sucralfate Hives, Rash and Unknown      Medication Documentation Review Audit       Reviewed by Paolo Reyna MD (Physician) on 24 at 1200      Medication Order Taking? Sig Documenting Provider Last Dose Status   cyclobenzaprine (Flexeril) 10 mg tablet [id number]  Take 1 tablet (10 mg) by mouth 2 times a day as needed for muscle spasms. Galo Kruger MD   24 2359   cyclobenzaprine (Flexeril) 5 mg tablet [id number] No Take by mouth. Historical Provider, MD Taking Active   drospirenone-ethinyl estradioL (Aby, Gianvi) 3-0.02 mg tablet [id number]  Take 1 tablet by mouth once daily. Padma Lazar MD  Active    mg tablet [id number]  TAKE ONE TABLET BY MOUTH THREE TIMES DAILY FOR 7 DAYS TIN Crandall  Active   traMADol (Ultram) 50 mg tablet [id number]  Take 1 tablet (50 mg) by mouth every 6 hours if needed for severe pain (7 - 10). Michael A Lopresti, MD  Active   Discontinued 24 1508   valACYclovir (Valtrex) 500 mg tablet [id number]  TAKE ONE TABLET BY MOUTH TWO TIMES A DAY FOR 3 DAYS FOR RECURRENT INFECTION. START AS SOON AS POSSIBLE WITHIN 48 TO 72 HOURS OF ONSET OF SYMPTOMS TIN Crandall  Active   Ventolin HFA 90 mcg/actuation inhaler [id number] No Inhale. Historical Provider, MD Taking Active                    Past Medical History:   Diagnosis Date    Difficulty in walking, not elsewhere classified 2020    Difficulty walking    Lumbago with sciatica, unspecified side 2020    Lumbago with sciatica    Obesity, unspecified 12/15/2013    Obesity    Personal history of other diseases of the nervous system and sense organs     History of chronic fatigue syndrome    Personal history of other infectious and parasitic diseases 2017    History of herpes zoster    Personal history of other specified conditions 2020    History of syncope     Social History     Tobacco Use   Smoking Status Every Day     Current packs/day: 1.00    Types: Cigarettes   Smokeless Tobacco Never     No family history on file.   Past Surgical History:   Procedure Laterality Date    MANDIBLE SURGERY  10/12/2015    Jaw Surgery    TONSILLECTOMY  10/12/2015    Tonsillectomy With Adenoidectomy       Assessment/Plan   Patient with multitude of GI symptoms, persistent right upper quadrant, epigastric pain.  Severe GERD.  The patient has indication for further assessment with EGD and colonoscopy to rule out upper GI and colon pathology as a reason for patient's symptoms, then discuss potential cholecystectomy for hyperkinetic biliary dyskinesia.  The further management will be adjusted to findings on EGD and colonoscopy.  Risks, benefits, alternative treatment were explained to the patient.  All questions were answered.  Informed consent was obtained.      Paolo Reyna MD

## 2024-07-30 ENCOUNTER — APPOINTMENT (OUTPATIENT)
Dept: BEHAVIORAL HEALTH | Facility: CLINIC | Age: 34
End: 2024-07-30
Payer: COMMERCIAL

## 2024-07-30 VITALS — HEART RATE: 72 BPM | TEMPERATURE: 98.3 F | SYSTOLIC BLOOD PRESSURE: 120 MMHG | DIASTOLIC BLOOD PRESSURE: 76 MMHG

## 2024-07-30 DIAGNOSIS — F32.81 PMDD (PREMENSTRUAL DYSPHORIC DISORDER): ICD-10-CM

## 2024-07-30 DIAGNOSIS — F41.1 GAD (GENERALIZED ANXIETY DISORDER): ICD-10-CM

## 2024-07-30 DIAGNOSIS — F33.41 RECURRENT MAJOR DEPRESSIVE DISORDER, IN PARTIAL REMISSION (CMS-HCC): ICD-10-CM

## 2024-07-30 PROCEDURE — 90792 PSYCH DIAG EVAL W/MED SRVCS: CPT

## 2024-07-30 RX ORDER — BUPROPION HYDROCHLORIDE 100 MG/1
100 TABLET, EXTENDED RELEASE ORAL DAILY
Qty: 60 TABLET | Refills: 0 | Status: SHIPPED | OUTPATIENT
Start: 2024-07-30 | End: 2024-07-30

## 2024-07-30 RX ORDER — BUPROPION HYDROCHLORIDE 150 MG/1
150 TABLET ORAL EVERY MORNING
Qty: 30 TABLET | Refills: 1 | Status: SHIPPED | OUTPATIENT
Start: 2024-07-30 | End: 2024-09-28

## 2024-07-30 RX ORDER — METFORMIN HYDROCHLORIDE 500 MG/1
500 TABLET ORAL
COMMUNITY

## 2024-07-30 ASSESSMENT — PATIENT HEALTH QUESTIONNAIRE - PHQ9
2. FEELING DOWN, DEPRESSED OR HOPELESS: SEVERAL DAYS
3. TROUBLE FALLING OR STAYING ASLEEP: SEVERAL DAYS
5. POOR APPETITE OR OVEREATING: NEARLY EVERY DAY
8. MOVING OR SPEAKING SO SLOWLY THAT OTHER PEOPLE COULD HAVE NOTICED. OR THE OPPOSITE, BEING SO FIGETY OR RESTLESS THAT YOU HAVE BEEN MOVING AROUND A LOT MORE THAN USUAL: NOT AT ALL
6. FEELING BAD ABOUT YOURSELF - OR THAT YOU ARE A FAILURE OR HAVE LET YOURSELF OR YOUR FAMILY DOWN: SEVERAL DAYS
1. LITTLE INTEREST OR PLEASURE IN DOING THINGS: NEARLY EVERY DAY
7. TROUBLE CONCENTRATING ON THINGS, SUCH AS READING THE NEWSPAPER OR WATCHING TELEVISION: NEARLY EVERY DAY
9. THOUGHTS THAT YOU WOULD BE BETTER OFF DEAD, OR OF HURTING YOURSELF: NOT AT ALL
SUM OF ALL RESPONSES TO PHQ9 QUESTIONS 1 & 2: 4
SUM OF ALL RESPONSES TO PHQ QUESTIONS 1-9: 15
10. IF YOU CHECKED OFF ANY PROBLEMS, HOW DIFFICULT HAVE THESE PROBLEMS MADE IT FOR YOU TO DO YOUR WORK, TAKE CARE OF THINGS AT HOME, OR GET ALONG WITH OTHER PEOPLE: VERY DIFFICULT
4. FEELING TIRED OR HAVING LITTLE ENERGY: NEARLY EVERY DAY

## 2024-07-30 ASSESSMENT — ENCOUNTER SYMPTOMS
CONSTITUTIONAL NEGATIVE: 1
NERVOUS/ANXIOUS: 1
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
## 2024-08-12 ENCOUNTER — APPOINTMENT (OUTPATIENT)
Dept: DERMATOLOGY | Facility: CLINIC | Age: 34
End: 2024-08-12
Payer: COMMERCIAL

## 2024-08-12 DIAGNOSIS — B34.8 OTHER VIRAL INFECTIONS OF UNSPECIFIED SITE: ICD-10-CM

## 2024-08-12 DIAGNOSIS — B07.8 OTHER VIRAL WARTS: Primary | ICD-10-CM

## 2024-08-12 DIAGNOSIS — B00.9 HSV (HERPES SIMPLEX VIRUS) INFECTION: ICD-10-CM

## 2024-08-12 PROCEDURE — 99203 OFFICE O/P NEW LOW 30 MIN: CPT | Performed by: NURSE PRACTITIONER

## 2024-08-12 RX ORDER — VALACYCLOVIR HYDROCHLORIDE 500 MG/1
500 TABLET, FILM COATED ORAL DAILY
Qty: 90 TABLET | Refills: 3 | Status: SHIPPED | OUTPATIENT
Start: 2024-08-12

## 2024-08-12 NOTE — PROGRESS NOTES
Dictation #1  MRN:[id number]  [facility]:[id number] Subjective     Ashley Berardinelli is a 34 y.o. female who presents for the following: multiple lesions.   New patient in for mutliple lesions to genitalia x 1 month no treatments.     Review of Systems:  No other skin or systemic complaints other than what is documented elsewhere in the note.    The following portions of the chart were reviewed this encounter and updated as appropriate:       Skin Cancer History  No skin cancer on file.    Specialty Problems    None    Past Medical History:  Ashley Berardinelli  has a past medical history of Difficulty in walking, not elsewhere classified (11/16/2020), Lumbago with sciatica, unspecified side (11/16/2020), Obesity, unspecified (12/15/2013), Personal history of other diseases of the nervous system and sense organs, Personal history of other infectious and parasitic diseases (07/05/2017), and Personal history of other specified conditions (07/21/2020).    Past Surgical History:  Ashley Berardinelli  has a past surgical history that includes Mandible surgery (10/12/2015) and Tonsillectomy (10/12/2015).    Family History:  Patient family history is not on file.    Social History:  Ashley Berardinelli  reports that she has been smoking cigarettes. She has never used smokeless tobacco. She reports current alcohol use. She reports that she does not use drugs.    Allergies:  Sucralfate    Current Medications / CAM's:    Current Outpatient Medications:     buPROPion XL (Wellbutrin XL) 150 mg 24 hr tablet, Take 1 tablet (150 mg) by mouth once daily in the morning. Do not crush, chew, or split., Disp: 30 tablet, Rfl: 1    cyclobenzaprine (Flexeril) 10 mg tablet, Take 1 tablet (10 mg) by mouth 2 times a day as needed for muscle spasms., Disp: 60 tablet, Rfl: 0    cyclobenzaprine (Flexeril) 5 mg tablet, Take by mouth., Disp: , Rfl:      mg tablet, TAKE ONE TABLET BY MOUTH THREE TIMES DAILY FOR 7 DAYS, Disp: 21 tablet, Rfl: 0     metFORMIN (Glucophage) 500 mg tablet, Take 1 tablet (500 mg) by mouth 2 times daily (morning and late afternoon)., Disp: , Rfl:     valACYclovir (Valtrex) 500 mg tablet, Take 1 tablet (500 mg) by mouth once daily., Disp: 90 tablet, Rfl: 3    Ventolin HFA 90 mcg/actuation inhaler, Inhale., Disp: , Rfl:      Objective   Well appearing patient in no apparent distress; mood and affect are within normal limits.      Assessment/Plan   1. Other viral warts (3)  Left Buttock; Right Buttock (2)    Warts are benign growths which are the result of a specific viral infection in the top layer top layer of the skin. As with other infections, warts may be transmitted from one individual to another. The viruses that cause warts are in the human papillomavirus (HPV) family.   There are multiple treatments for warts, none of which is 100% effective. Treatment may destroy an individual wart, but the wart virus may remain in the surrounding skin causing some more warts to appear. In general, the therapy of warts is nonspecific- the goal is to destroy the skin surrounding the wart, thereby removing the wart virus in the process. No matter what treatment is utilized, warts commonly reoccur.    PLAN:  The viral etiology, natural history, possibility of recurrence and/or persistence of warts after treatment.  Risks, benefits, side effects, alternatives and options were discussed with patient and the patient voiced understanding.   Advised patient that response can vary from minimal redness at procedure site to blood blistering. Cryotherapy can result in scarring and hypo-, hyper- or depigmentation of skin. Patient. elected cryotherapy, which was completed in office and tolerated well.      Patient elected cryotherapy, which was completed in office and tolerated well.          Destr of lesion - Left Buttock, Right Buttock (2)  Complexity: simple    Destruction method: cryotherapy    Informed consent: discussed and consent obtained    Lesion  destroyed using liquid nitrogen: Yes    Cryotherapy cycles:  2  Outcome: patient tolerated procedure well with no complications    Post-procedure details: wound care instructions given      2. HSV (herpes simplex virus) infection (2)  Right Labium Majus; Prepuce of Clitoris    Certain triggers can cause the hibernating (latent) virus to wake up, become active, and travel back to the skin. These recurrent HSV infections may develop frequently (every few weeks), or they may never develop. Recurrent infections tend to be milder than primary infections and generally occur in the same location as the primary infection. Common triggers include; fever or illness, sun exposure, hormonal changes, such as those due to menstruation or pregnancy, stress, trauma, such as that caused by dental work or cuts from shaving, and surgery.     3. Other viral infections of unspecified site    Related Medications  valACYclovir (Valtrex) 500 mg tablet  Take 1 tablet (500 mg) by mouth once daily.

## 2024-08-13 ENCOUNTER — APPOINTMENT (OUTPATIENT)
Dept: BEHAVIORAL HEALTH | Facility: CLINIC | Age: 34
End: 2024-08-13
Payer: COMMERCIAL

## 2024-08-30 ENCOUNTER — TELEPHONE (OUTPATIENT)
Dept: OBSTETRICS AND GYNECOLOGY | Facility: CLINIC | Age: 34
End: 2024-08-30

## 2024-09-09 ENCOUNTER — APPOINTMENT (OUTPATIENT)
Dept: DERMATOLOGY | Facility: CLINIC | Age: 34
End: 2024-09-09
Payer: COMMERCIAL

## 2024-09-11 ENCOUNTER — APPOINTMENT (OUTPATIENT)
Dept: SURGERY | Facility: CLINIC | Age: 34
End: 2024-09-11
Payer: COMMERCIAL

## 2024-09-11 DIAGNOSIS — K82.8 BILIARY DYSKINESIA: ICD-10-CM

## 2024-09-11 DIAGNOSIS — R10.11 COLICKY RIGHT UPPER QUADRANT PAIN: Primary | ICD-10-CM

## 2024-09-11 DIAGNOSIS — R94.8 ABNORMAL BILIARY HIDA SCAN: ICD-10-CM

## 2024-09-11 PROCEDURE — 99214 OFFICE O/P EST MOD 30 MIN: CPT | Performed by: PHYSICIAN ASSISTANT

## 2024-09-11 NOTE — PROGRESS NOTES
Subjective   Patient ID: Ashley Berardinelli is a 34 y.o. female who presents for colicky right upper quadrant pain, review of EGD and colonoscopy      HPI  Is a 34-year-old female who been having colicky right upper quadrant pain does not have cholelithiasis but does have a biliary dyskinesia with a positive HIDA scan that shows hyper biliary dyskinesia with ejection fraction of 94%.  Patient's status post EGD and colonoscopy is here for discussion of pathology and discussion of cholecystectomy    Review of Systems    Review of systems is negative other than what is mentioned above      Physical Exam  Eyes: Conjunctiva non -icteric and eye lids are without obvious rash or drooping. Pupils are symmetric.   Ears, Nose, Mouth, and Throat: External ears and nose appear to be without deformity or rash. No lesions or masses noted. Hearing is grossly intact.   Neck:. No JVD noted, tracheal position is midline. No thyromegaly, no thyroid nodules  Head and Face: Examination of the head and face revealed no abnormalities.   Respiratory: No gasping or shortness of breath noted, no use of accessory muscles noted.  Lungs are clear to auscultate  Cardiovascular: Examination for edema is normal, regular rate and rhythm S1-S2  GI: Abdomen non tender to palpation, bowel sounds are present  Skin: No rashes or open lesions/ulcers identified on skin.   Musk: Digits/nails show no clubbing or cyanosis. No asymmetry or masses noted of the musculature. Examination of the muscles/joints/bones show normal range of motion. Gait is grossly normally.   Neurologic: Cranial nerves II- XII intact, motor strength 5/5 muscle strength of the lower extremities bilaterally and equal.      Objective     No diagnosis found.   Patient Active Problem List   Diagnosis    Acute pharyngitis    Anxiety    ASCUS with positive high risk HPV cervical    Chronic low back pain    Chronic pain    Closed fracture of sacrum with routine healing    Back pain     Coccydynia    Fracture of coccyx (Multi)    Tailbone injury    GERD (gastroesophageal reflux disease)    Lumbar radiculopathy    Lumbar spondylosis    Moderate episode of recurrent major depressive disorder (Multi)    Neck pain    Oligomenorrhea    PCOS (polycystic ovarian syndrome)    Right hip pain    Scoliosis    Stress incontinence, female    Tobacco dependence    Vaginal discharge    Cervical atypism    Myopia    Fall at home    Annual physical exam    Elevated testosterone level in female      Allergies   Allergen Reactions    Sucralfate Hives, Rash and Unknown      Medication Documentation Review Audit       Reviewed by Natalie Brooks LPN (Licensed Nurse) on 24 at 1501      Medication Order Taking? Sig Documenting Provider Last Dose Status   buPROPion XL (Wellbutrin XL) 150 mg 24 hr tablet [id number]  Take 1 tablet (150 mg) by mouth once daily in the morning. Do not crush, chew, or split. TIN Rodriguez  Active   cyclobenzaprine (Flexeril) 10 mg tablet [id number] No Take 1 tablet (10 mg) by mouth 2 times a day as needed for muscle spasms. Galo Kruger MD Taking  24 2359   cyclobenzaprine (Flexeril) 5 mg tablet [id number] No Take by mouth. Historical Provider, MD Taking Active    mg tablet [id number] No TAKE ONE TABLET BY MOUTH THREE TIMES DAILY FOR 7 DAYS TIN Crandall Taking Active   metFORMIN (Glucophage) 500 mg tablet [id number] No Take 1 tablet (500 mg) by mouth 2 times daily (morning and late afternoon). Historical Provider, MD Taking Active   valACYclovir (Valtrex) 500 mg tablet [id number] No TAKE ONE TABLET BY MOUTH TWO TIMES A DAY FOR 3 DAYS FOR RECURRENT INFECTION. START AS SOON AS POSSIBLE WITHIN 48 TO 72 HOURS OF ONSET OF SYMPTOMS TIN Crandall Taking Active   Ventolin HFA 90 mcg/actuation inhaler [id number] No Inhale. Historical Provider, MD Taking Active                    Past Medical History:   Diagnosis Date     Difficulty in walking, not elsewhere classified 11/16/2020    Difficulty walking    Lumbago with sciatica, unspecified side 11/16/2020    Lumbago with sciatica    Obesity, unspecified 12/15/2013    Obesity    Personal history of other diseases of the nervous system and sense organs     History of chronic fatigue syndrome    Personal history of other infectious and parasitic diseases 07/05/2017    History of herpes zoster    Personal history of other specified conditions 07/21/2020    History of syncope     Social History     Tobacco Use   Smoking Status Every Day    Current packs/day: 1.00    Types: Cigarettes   Smokeless Tobacco Never     No family history on file.   Past Surgical History:   Procedure Laterality Date    MANDIBLE SURGERY  10/12/2015    Jaw Surgery    TONSILLECTOMY  10/12/2015    Tonsillectomy With Adenoidectomy       Assessment/Plan   Today we had a discussion about her EGD and colonoscopy EGD there was a duodenal polyp was removed and benign.  Patient is to use Pepcid as needed colonoscopy was 1 rectal polyp that was hyperplastic and benign.  Patient can move forward with laparoscopic cholecystectomy for biliary dyskinesia.    Today we had a discussion about laparoscopic cholecystectomy, possible open .  Patient was informed that this is an outpatient surgery.  That surgery requires an hour and a half.  The procedure will be  done through 4 small incisions or possible opened procedure .   General anesthesia is required.  Patient was also instructed that if during surgery there were stones found within her ducts that they would require an overnight stay in the hospital and a gastroenterology consult and possible ERCP with stent placement or removal of duct stones.  Patient will need a ride to and from the hospital.  Risk and benefits such as bleeding and infection were discussed.  Alternative treatment was discussed and explained.  All questions were answered.  Patient would like to  proceed.    Encounter Diagnoses   Name Primary?    Colicky right upper quadrant pain Yes    Biliary dyskinesia     Abnormal biliary HIDA scan      I have reviewed all data including labs,radiologic and previous reports.     **Portions of this medical record have been created using voice recognition software and may have minor errors which we are inherent in voice recognition systems it has not been fully edited for typographical or grammatical errors**        Xochitl Rojas PA-C

## 2024-09-18 ENCOUNTER — APPOINTMENT (OUTPATIENT)
Dept: BEHAVIORAL HEALTH | Facility: CLINIC | Age: 34
End: 2024-09-18
Payer: COMMERCIAL

## 2024-09-18 VITALS
WEIGHT: 223.4 LBS | TEMPERATURE: 98.9 F | SYSTOLIC BLOOD PRESSURE: 132 MMHG | DIASTOLIC BLOOD PRESSURE: 79 MMHG | HEART RATE: 84 BPM | RESPIRATION RATE: 16 BRPM | BODY MASS INDEX: 39.58 KG/M2 | HEIGHT: 63 IN

## 2024-09-18 DIAGNOSIS — F33.41 RECURRENT MAJOR DEPRESSIVE DISORDER, IN PARTIAL REMISSION (CMS-HCC): ICD-10-CM

## 2024-09-18 DIAGNOSIS — F41.1 GAD (GENERALIZED ANXIETY DISORDER): ICD-10-CM

## 2024-09-18 DIAGNOSIS — F32.81 PMDD (PREMENSTRUAL DYSPHORIC DISORDER): ICD-10-CM

## 2024-09-18 PROCEDURE — 3008F BODY MASS INDEX DOCD: CPT

## 2024-09-18 PROCEDURE — 99214 OFFICE O/P EST MOD 30 MIN: CPT

## 2024-09-18 RX ORDER — HYDROXYZINE HYDROCHLORIDE 25 MG/1
50 TABLET, FILM COATED ORAL NIGHTLY
Qty: 180 TABLET | Refills: 0 | Status: SHIPPED | OUTPATIENT
Start: 2024-09-18 | End: 2024-12-17

## 2024-09-18 RX ORDER — HYDROXYZINE HYDROCHLORIDE 25 MG/1
25 TABLET, FILM COATED ORAL NIGHTLY
COMMUNITY
End: 2024-09-18 | Stop reason: SDUPTHER

## 2024-09-18 RX ORDER — PROPRANOLOL HYDROCHLORIDE 10 MG/1
10 TABLET ORAL DAILY PRN
Qty: 90 TABLET | Refills: 0 | Status: SHIPPED | OUTPATIENT
Start: 2024-09-18 | End: 2024-12-17

## 2024-09-18 RX ORDER — BUSPIRONE HYDROCHLORIDE 7.5 MG/1
7.5 TABLET ORAL 2 TIMES DAILY
Qty: 60 TABLET | Refills: 1 | Status: SHIPPED | OUTPATIENT
Start: 2024-09-18 | End: 2024-11-17

## 2024-09-18 RX ORDER — BUPROPION HYDROCHLORIDE 150 MG/1
150 TABLET ORAL EVERY MORNING
Qty: 90 TABLET | Refills: 0 | Status: SHIPPED | OUTPATIENT
Start: 2024-09-18 | End: 2024-12-17

## 2024-09-18 ASSESSMENT — COLUMBIA-SUICIDE SEVERITY RATING SCALE - C-SSRS
2. HAVE YOU ACTUALLY HAD ANY THOUGHTS OF KILLING YOURSELF?: NO
6. HAVE YOU EVER DONE ANYTHING, STARTED TO DO ANYTHING, OR PREPARED TO DO ANYTHING TO END YOUR LIFE?: NO
TOTAL  NUMBER OF ABORTED OR SELF INTERRUPTED ATTEMPTS LIFETIME: NO
ATTEMPT LIFETIME: NO
TOTAL  NUMBER OF INTERRUPTED ATTEMPTS LIFETIME: NO
1. HAVE YOU WISHED YOU WERE DEAD OR WISHED YOU COULD GO TO SLEEP AND NOT WAKE UP?: NO

## 2024-09-18 ASSESSMENT — ENCOUNTER SYMPTOMS
CONSTITUTIONAL NEGATIVE: 1
NERVOUS/ANXIOUS: 1

## 2024-09-18 NOTE — PROGRESS NOTES
"  Assessment/Plan     Impression:  Ashley Berardinelli is a 34 y.o. female who presents in person for a follow up visit with CC of  \" I am doing better than last time. I feel like my PMDD is better. My symptoms are better.  I WAS panicky on Monday and used my inhaler twice.\"  Patient consents to treatment.    Problem List Items Addressed This Visit    None  Visit Diagnoses         Codes    Recurrent major depressive disorder, in partial remission (CMS-HCC)     F33.41    Relevant Medications    buPROPion XL (Wellbutrin XL) 150 mg 24 hr tablet    Other Relevant Orders    Referral to Psychology    Follow Up In Psychiatry    CAMERON (generalized anxiety disorder)     F41.1    Relevant Medications    busPIRone (Buspar) 7.5 mg tablet    buPROPion XL (Wellbutrin XL) 150 mg 24 hr tablet    propranolol (Inderal) 10 mg tablet    hydrOXYzine HCL (Atarax) 25 mg tablet    Other Relevant Orders    Referral to Psychology    Follow Up In Psychiatry    PMDD (premenstrual dysphoric disorder)     F32.81    Relevant Medications    buPROPion XL (Wellbutrin XL) 150 mg 24 hr tablet    Other Relevant Orders    Referral to Psychology    Follow Up In Psychiatry            Patient is reminded that if there is SI to call [phone] and get themselves to the closest ED for evaluation, otherwise contact me for other questions/concerns.        Patient presenting to outpatient treatment for a scheduled psych follow up visit.  HPI   Background:     Patient was seen a couple of weeks ago for management of PMDD.  Patient was started on Wellbutrin  mg. Patient reports the medication has helped with PMDD, and depression.  Patient hopes to feel less anxious. Reports she has not yet started talk therapy. Reports \" I don't have negative thoughts  as in the past. I am doing better. \"    Characteristics/Recent psychiatric symptoms (pertinent positives and negatives):    Patient reports she is still having symptoms of  anxiety. Reports getting 6 hours of sleep at " night. Denies depressive symptoms.  Denies wishes for death or consideration for suicide.  CSSRS negative. Denies yajaira of psychosis. Patient does explain that current dose of Wellbutrin XL has been successful in the management depressive symptoms.       -SI/HI: Denies        Psychiatric Review Of Systems:    Depressive Symptoms: negative  Manic Symptoms: negative  Anxiety Symptoms: General Anxiety Disorder (CAMERON)CAMERON Behaviors: difficult to control worry and excessive anxiety/worry  Psychotic Symptoms: negative    REVIEW OF SYSTEMS  Review of Systems   Constitutional: Negative.    Psychiatric/Behavioral:  The patient is nervous/anxious.        All other ROS negative  Current Medications:    Current Outpatient Medications:     cyclobenzaprine (Flexeril) 10 mg tablet, Take 1 tablet (10 mg) by mouth 2 times a day as needed for muscle spasms., Disp: 60 tablet, Rfl: 0    cyclobenzaprine (Flexeril) 5 mg tablet, Take by mouth., Disp: , Rfl:      mg tablet, TAKE ONE TABLET BY MOUTH THREE TIMES DAILY FOR 7 DAYS, Disp: 21 tablet, Rfl: 0    metFORMIN (Glucophage) 500 mg tablet, Take 1 tablet (500 mg) by mouth 2 times daily (morning and late afternoon)., Disp: , Rfl:     valACYclovir (Valtrex) 500 mg tablet, Take 1 tablet (500 mg) by mouth once daily., Disp: 90 tablet, Rfl: 3    Ventolin HFA 90 mcg/actuation inhaler, Inhale., Disp: , Rfl:     buPROPion XL (Wellbutrin XL) 150 mg 24 hr tablet, Take 1 tablet (150 mg) by mouth once daily in the morning. Do not crush, chew, or split., Disp: 90 tablet, Rfl: 0    busPIRone (Buspar) 7.5 mg tablet, Take 1 tablet (7.5 mg) by mouth 2 times a day., Disp: 60 tablet, Rfl: 1    hydrOXYzine HCL (Atarax) 25 mg tablet, Take 2 tablets (50 mg) by mouth once daily at bedtime., Disp: 180 tablet, Rfl: 0    propranolol (Inderal) 10 mg tablet, Take 1 tablet (10 mg) by mouth once daily as needed (Anxiety attacks)., Disp: 90 tablet, Rfl: 0     Medical History:  Past Medical History:   Diagnosis  Date    Difficulty in walking, not elsewhere classified 11/16/2020    Difficulty walking    Lumbago with sciatica, unspecified side 11/16/2020    Lumbago with sciatica    Obesity, unspecified 12/15/2013    Obesity    Personal history of other diseases of the nervous system and sense organs     History of chronic fatigue syndrome    Personal history of other infectious and parasitic diseases 07/05/2017    History of herpes zoster    Personal history of other specified conditions 07/21/2020    History of syncope     Surgical History:  Past Surgical History:   Procedure Laterality Date    MANDIBLE SURGERY  10/12/2015    Jaw Surgery    TONSILLECTOMY  10/12/2015    Tonsillectomy With Adenoidectomy     Family History:  No family history on file.  Social History:  Social History     Socioeconomic History    Marital status: Single     Spouse name: Not on file    Number of children: Not on file    Years of education: Not on file    Highest education level: Not on file   Occupational History    Not on file   Tobacco Use    Smoking status: Every Day     Current packs/day: 1.00     Types: Cigarettes    Smokeless tobacco: Never   Vaping Use    Vaping status: Never Used   Substance and Sexual Activity    Alcohol use: Yes     Comment: socially once month    Drug use: Never    Sexual activity: Defer   Other Topics Concern    Not on file   Social History Narrative    Not on file     Social Determinants of Health     Financial Resource Strain: Not on file   Food Insecurity: Not on file   Transportation Needs: Not on file   Physical Activity: Not on file   Stress: Not on file   Social Connections: Not on file   Intimate Partner Violence: Not on file   Housing Stability: Not on file     Vitals:  Vitals:    09/18/24 1524   BP: 132/79   Pulse: 84   Resp: 16   Temp: 37.2 °C (98.9 °F)       Allergies:  Allergies   Allergen Reactions    Sucralfate Hives, Rash and Unknown       -PCP: No primary care provider on file.  -Pt reports currently  "is not pregnant, and currently is sexually active, does not use birth control. LMP: 3 weeks  -TBI/head trauma/LOC/seizure hx: Denies      Objective   Mental Status Exam    Appearance: Well groomed    Attitude: Calm, cooperative, and engaged in conversation.    Behavior: Appropriate eye contact.     Motor Activity: No psychomotor agitation or retardation. No abnormal movements, tremors or tics. No evidence of extrapyramidal symptoms or tardive dyskinesia.    Speech: Regular rate, rhythm, volume. Spontaneous, no pressured speech.    Mood:  \" My mood is okay. I am just a little anxious\"    Affect: Full range, mood congruent.    Thought Process: Linear, logical, and goal-directed. No loose associations or gross thought disorganization.    Thought Content: Denied current suicidal ideation or thoughts of harm to self, denied homicidal ideation or thoughts of harm to others. No delusional thinking elicited. No perseverations or obsessions identified.     Perception: Did not endorse auditory or visual hallucinations, did not appear to be responding to hallucinatory stimuli.     Cognition: Alert, oriented x3. Preserved attention span and concentration, recent and remote memory. Adequate fund of knowledge. No deficits in language.     Insight: Fair, in regards to understanding mental health condition    Judgement: Fair        Physical Exam    IMPRESSION  -NO Benefit  for anxiety  and panics symptoms with Wellbutrin XL, recommend to ADD Buspar 7.5 mg BID for anxiety and Propranolol 10 mg daily PRN for panic attacks.    -Diagnoses and all orders for this visit:  Recurrent major depressive disorder, in partial remission (CMS-HCC)  -     buPROPion XL (Wellbutrin XL) 150 mg 24 hr tablet; Take 1 tablet (150 mg) by mouth once daily in the morning. Do not crush, chew, or split.  -     Referral to Psychology; Future  -     Follow Up In Psychiatry; Future  CAMERON (generalized anxiety disorder)  -     busPIRone (Buspar) 7.5 mg tablet; Take " 1 tablet (7.5 mg) by mouth 2 times a day.  -     buPROPion XL (Wellbutrin XL) 150 mg 24 hr tablet; Take 1 tablet (150 mg) by mouth once daily in the morning. Do not crush, chew, or split.  -     propranolol (Inderal) 10 mg tablet; Take 1 tablet (10 mg) by mouth once daily as needed (Anxiety attacks).  -     hydrOXYzine HCL (Atarax) 25 mg tablet; Take 2 tablets (50 mg) by mouth once daily at bedtime.  -     Referral to Psychology; Future  -     Follow Up In Psychiatry; Future  PMDD (premenstrual dysphoric disorder)  -     buPROPion XL (Wellbutrin XL) 150 mg 24 hr tablet; Take 1 tablet (150 mg) by mouth once daily in the morning. Do not crush, chew, or split.  -     Referral to Psychology; Future  -     Follow Up In Psychiatry; Future      MEDICAL-DECISION MAKING  -Patient educated and verbalized understanding on benefits, risks, and side effects of Propranolol, Buspar. START psychotherapy at . Follow-up with psychiatry in 4 weeks for medication evaluation and effectiveness.        Psych med regimen as follows:  - START Buspar 7.5 mg BID for anxiety  - START Propranolol 10 mg daily PRN for anxiety attacks  -CONTINUE current psychiatric medications as prescribed  -START Psychotherapy  -CONTINUE exercising and eating heathy diet  -Stay Hydrated to avoid low BP while on Propranolol     SI/HI ASSESSMENT  -Patient denied current suicidal ideation or thoughts of harm to self, denied homicidal ideation or thoughts of harm to others. No delusional thinking elicited. No perseverations or obsessions identified.       PLAN  -Continue Medications as directed.  -Follow-up with this provider in 4 weeks.  -Risks/benefits/assessment of medication interventions discussed with pt; pt agreeable to plan. Will continue to monitor for symptoms mgmt and SEs and adjust plan as needed.  -MI to increase coping skills/behavior regulation.  -Safety plan reviewed.  -Call  Psychiatry at [phone] with issues.  -For [facility]  [facility] [place] [facility] is a 24/7 hotline you can call for assistance at [phone]. Please call 911 or go to your closest Emergency Room if you feel worse. This includes thoughts of hurting yourself or anyone else, or having other troubles such as hearing voices, seeing visions, or having new and scary thoughts about the people around you.      OARRS:  TIN Rodriguez on 9/18/2024  3:43 PM  I have personally reviewed the OARRS report for Ashley Berardinelli. I have considered the risks of abuse, dependence, addiction and diversion  Medication is felt to be clinically appropriate based on documented diagnosis.          Aria T Khan, APRN-CNP  Record Review: extensive   CALL OFFICE IN CASE OF SIDE EFFECT TO SCHEDULE A VISIT FOR ASSESSMENT -[phone]  Follow up:     October the 23rd at 4 pm in person  Time Spent:  Prep:   Direct time:  25 minutes  Documentation:  5 minutes  Total: 30 minutes

## 2024-09-27 ENCOUNTER — TELEPHONE (OUTPATIENT)
Dept: BEHAVIORAL HEALTH | Facility: CLINIC | Age: 34
End: 2024-09-27
Payer: COMMERCIAL

## 2024-09-27 DIAGNOSIS — F33.41 RECURRENT MAJOR DEPRESSIVE DISORDER, IN PARTIAL REMISSION (CMS-HCC): ICD-10-CM

## 2024-09-27 RX ORDER — BUPROPION HYDROCHLORIDE 300 MG/1
300 TABLET ORAL DAILY
Qty: 60 TABLET | Refills: 0 | Status: SHIPPED | OUTPATIENT
Start: 2024-09-27 | End: 2024-11-26

## 2024-09-27 NOTE — PROGRESS NOTES
BRIAN called patient after receiving a [facility] message that she is feeling down and depressed. BRIAN instructed patient to start taking Wellbutrin  mg daily starting tomorrow morning. To stop taking Buspar for now, and we will discuss when we need to had Buspar after the next scheduled appointment.

## 2024-10-07 ENCOUNTER — APPOINTMENT (OUTPATIENT)
Dept: DERMATOLOGY | Facility: CLINIC | Age: 34
End: 2024-10-07
Payer: COMMERCIAL

## 2024-10-23 ENCOUNTER — APPOINTMENT (OUTPATIENT)
Dept: BEHAVIORAL HEALTH | Facility: CLINIC | Age: 34
End: 2024-10-23
Payer: COMMERCIAL

## 2024-10-31 ENCOUNTER — TELEMEDICINE (OUTPATIENT)
Dept: BEHAVIORAL HEALTH | Facility: CLINIC | Age: 34
End: 2024-10-31
Payer: COMMERCIAL

## 2024-10-31 DIAGNOSIS — F33.41 RECURRENT MAJOR DEPRESSIVE DISORDER, IN PARTIAL REMISSION (CMS-HCC): ICD-10-CM

## 2024-10-31 DIAGNOSIS — F41.1 GAD (GENERALIZED ANXIETY DISORDER): ICD-10-CM

## 2024-10-31 DIAGNOSIS — F32.81 PMDD (PREMENSTRUAL DYSPHORIC DISORDER): ICD-10-CM

## 2024-10-31 PROCEDURE — 99214 OFFICE O/P EST MOD 30 MIN: CPT

## 2024-10-31 RX ORDER — SERTRALINE HYDROCHLORIDE 25 MG/1
50 TABLET, FILM COATED ORAL DAILY
Qty: 180 TABLET | Refills: 0 | Status: SHIPPED | OUTPATIENT
Start: 2024-10-31 | End: 2025-01-29

## 2024-10-31 ASSESSMENT — ENCOUNTER SYMPTOMS
CONSTITUTIONAL NEGATIVE: 1
NERVOUS/ANXIOUS: 1

## 2024-12-02 ENCOUNTER — APPOINTMENT (OUTPATIENT)
Dept: BEHAVIORAL HEALTH | Facility: CLINIC | Age: 34
End: 2024-12-02
Payer: COMMERCIAL

## 2024-12-02 DIAGNOSIS — F32.81 PMDD (PREMENSTRUAL DYSPHORIC DISORDER): ICD-10-CM

## 2024-12-02 DIAGNOSIS — F33.41 RECURRENT MAJOR DEPRESSIVE DISORDER, IN PARTIAL REMISSION (CMS-HCC): ICD-10-CM

## 2024-12-02 DIAGNOSIS — F41.1 GAD (GENERALIZED ANXIETY DISORDER): ICD-10-CM

## 2024-12-02 DIAGNOSIS — G47.00 INSOMNIA, UNSPECIFIED TYPE: ICD-10-CM

## 2024-12-02 PROCEDURE — 99214 OFFICE O/P EST MOD 30 MIN: CPT

## 2024-12-02 RX ORDER — HYDROGEN PEROXIDE 3 %
20 SOLUTION, NON-ORAL MISCELLANEOUS
COMMUNITY

## 2024-12-02 RX ORDER — TRAZODONE HYDROCHLORIDE 50 MG/1
50 TABLET ORAL NIGHTLY PRN
Qty: 90 TABLET | Refills: 0 | Status: SHIPPED | OUTPATIENT
Start: 2024-12-02 | End: 2025-03-02

## 2024-12-02 RX ORDER — SERTRALINE HYDROCHLORIDE 25 MG/1
75 TABLET, FILM COATED ORAL DAILY
Qty: 270 TABLET | Refills: 0 | Status: SHIPPED | OUTPATIENT
Start: 2024-12-02 | End: 2025-03-02

## 2024-12-02 ASSESSMENT — ENCOUNTER SYMPTOMS
CONSTITUTIONAL NEGATIVE: 1
PSYCHIATRIC NEGATIVE: 1

## 2024-12-02 NOTE — PROGRESS NOTES
"I performed this visit using real-time telehealth tools, including an AUDIO/VIDEO connection between Ashley Berardinelli who is at Work and TIN Rodriguez who is at At home office working via Telehealth.  Virtual or Telephone Consent    An interactive audio and video telecommunication system which permits real time communications between the patient (at the originating site) and provider (at the distant site) was utilized to provide this telehealth service.   Verbal consent was requested and obtained from Ashley Berardinelli on this date, 12/02/24 for a telehealth visit.       Assessment/Plan     Impression:  Ashley Berardinelli is a 34 y.o. female who presents via telehealth visit for a follow up visit with CC of  \" I am doing better than I was before. I still wake up in the middle of the night. \"    Patient consents to treatment.    Problem List Items Addressed This Visit    None  Visit Diagnoses         Codes    Recurrent major depressive disorder, in partial remission (CMS-HCC)     F33.41    Relevant Medications    sertraline (Zoloft) 25 mg tablet    Other Relevant Orders    Referral to Psychology    Follow Up In Psychiatry    CAMERON (generalized anxiety disorder)     F41.1    Relevant Medications    sertraline (Zoloft) 25 mg tablet    Other Relevant Orders    Referral to Psychology    Follow Up In Psychiatry    PMDD (premenstrual dysphoric disorder)     F32.81    Relevant Medications    sertraline (Zoloft) 25 mg tablet    Other Relevant Orders    Referral to Psychology    Follow Up In Psychiatry    Insomnia, unspecified type     G47.00    Relevant Medications    traZODone (Desyrel) 50 mg tablet    Other Relevant Orders    Follow Up In Psychiatry            Patient is reminded that if there is SI to call [phone] and get themselves to the closest ED for evaluation, otherwise contact me for other questions/concerns.    Patient presenting to outpatient treatment for a scheduled psych follow up visit.      HPI " "  Background:   Patient presenting to outpatient treatment for a scheduled psych follow up visit.  Patient is at work for the appointment.  Patient was seen a couple of weeks ago for management of PMS, PMDD, CAMERON and depression.  Patient was started on Zoloft 50 mg for PMS, CAMERON and MDD. Patient reports the medication has helped with managing MDD, PMS, anxiety and depressive symptoms.  Patient hopes to keep feeling better.  Reports \" she had the vasevagal syncope\" a couple of weeks ago due to dehydration. And she was assessed by a provider who said everything looked fine. BRIAN suggested to the patient to go to the ED for evaluation such as a CAT scan to make sure everything is okay.  Characteristics/Recent psychiatric symptoms (pertinent positives and negatives):    Patient reports still having symptoms of waking up in the middle of the night. Explains she would sleep for 4 hours then another 4 hours.  Reports getting 8 hour total of sleep at night. Appetite is okay.  Denies wishes for death or consideration for suicide.  CSSRS negative. Denies yajaira of psychosis. Denies hx hospitalization.   Patient does explain that current dose of Zoloft has been successful in the management of anxiety and depressive symptoms.     -SI/HI : Denies        Psychiatric Review Of Systems:  Depressive Symptoms: negative  Manic Symptoms: negative    Anxiety Symptoms: Negative  Psychotic Symptoms: negative      REVIEW OF SYSTEMS  Review of Systems   Constitutional: Negative.    Psychiatric/Behavioral: Negative.       All other ROS negative    Current Medications:    Current Outpatient Medications:     esomeprazole (NexIUM) 20 mg DR capsule, Take 1 capsule (20 mg) by mouth once daily in the morning. Take before meals. Do not open capsule., Disp: , Rfl:     hydrOXYzine HCL (Atarax) 25 mg tablet, Take 2 tablets (50 mg) by mouth once daily at bedtime., Disp: 180 tablet, Rfl: 0    metFORMIN (Glucophage) 500 mg tablet, Take 1 tablet (500 mg) by " mouth 2 times daily (morning and late afternoon)., Disp: , Rfl:     propranolol (Inderal) 10 mg tablet, Take 1 tablet (10 mg) by mouth once daily as needed (Anxiety attacks)., Disp: 90 tablet, Rfl: 0    valACYclovir (Valtrex) 500 mg tablet, Take 1 tablet (500 mg) by mouth once daily., Disp: 90 tablet, Rfl: 3    Ventolin HFA 90 mcg/actuation inhaler, Inhale., Disp: , Rfl:     sertraline (Zoloft) 25 mg tablet, Take 3 tablets (75 mg) by mouth once daily., Disp: 270 tablet, Rfl: 0    traZODone (Desyrel) 50 mg tablet, Take 1 tablet (50 mg) by mouth as needed at bedtime for sleep., Disp: 90 tablet, Rfl: 0     Medical History:  Past Medical History:   Diagnosis Date    Difficulty in walking, not elsewhere classified 11/16/2020    Difficulty walking    Lumbago with sciatica, unspecified side 11/16/2020    Lumbago with sciatica    Obesity, unspecified 12/15/2013    Obesity    Personal history of other diseases of the nervous system and sense organs     History of chronic fatigue syndrome    Personal history of other infectious and parasitic diseases 07/05/2017    History of herpes zoster    Personal history of other specified conditions 07/21/2020    History of syncope     Surgical History:  Past Surgical History:   Procedure Laterality Date    MANDIBLE SURGERY  10/12/2015    Jaw Surgery    TONSILLECTOMY  10/12/2015    Tonsillectomy With Adenoidectomy     Family History:  No family history on file.  Social History:  Social History     Socioeconomic History    Marital status: Single     Spouse name: Not on file    Number of children: Not on file    Years of education: Not on file    Highest education level: Not on file   Occupational History    Not on file   Tobacco Use    Smoking status: Every Day     Current packs/day: 1.00     Types: Cigarettes    Smokeless tobacco: Never   Vaping Use    Vaping status: Never Used   Substance and Sexual Activity    Alcohol use: Yes     Comment: socially once month    Drug use: Never     "Sexual activity: Defer   Other Topics Concern    Not on file   Social History Narrative    Not on file     Social Drivers of Health     Financial Resource Strain: Not on file   Food Insecurity: Not on file   Transportation Needs: Not on file   Physical Activity: Not on file   Stress: Not on file   Social Connections: Not on file   Intimate Partner Violence: Not on file   Housing Stability: Not on file     Vitals:  There were no vitals filed for this visit.  Allergies:  Allergies   Allergen Reactions    Augmentin [Amoxicillin-Pot Clavulanate] Wheezing    Sucralfate Hives, Rash and Unknown       -PCP: No primary care provider on file.  -Pt reports currently is not pregnant, and currently is sexually active, does not use birth control. LMP: November 19th  -TBI/head trauma/LOC/seizure hx: Denies    Objective   Appearance: Well groomed    Attitude: Calm, cooperative, and engaged in conversation.    Behavior: Appropriate eye contact.     Motor Activity: No psychomotor agitation or retardation. No abnormal movements, tremors or tics. No evidence of extrapyramidal symptoms or tardive dyskinesia.    Speech: Regular rate, rhythm, volume. Spontaneous, no pressured speech.    Mood:  \" I am better\"    Affect: Full range, mood congruent.    Thought Process: Linear, logical, and goal-directed. No loose associations or gross thought disorganization.    Thought Content: Denied current suicidal ideation or thoughts of harm to self, denied homicidal ideation or thoughts of harm to others. No delusional thinking elicited. No perseverations or obsessions identified.     Perception: Did not endorse auditory or visual hallucinations, did not appear to be responding to hallucinatory stimuli.     Cognition: Alert, oriented x3. Preserved attention span and concentration, recent and remote memory. Adequate fund of knowledge. No deficits in language.     Insight: Fair, in regards to understanding mental health condition    Judgement: " Fair        Physical Exam      -Gena was seen today for anxiety, follow-up, mdd (major depressive disorder) and sleeping problem.  Diagnoses and all orders for this visit:  Recurrent major depressive disorder, in partial remission (CMS-HCC)  -     Follow Up In Psychiatry  -     sertraline (Zoloft) 25 mg tablet; Take 3 tablets (75 mg) by mouth once daily.  -     Referral to Psychology; Future  -     Follow Up In Psychiatry; Future  CAMERON (generalized anxiety disorder)  -     Follow Up In Psychiatry  -     sertraline (Zoloft) 25 mg tablet; Take 3 tablets (75 mg) by mouth once daily.  -     Referral to Psychology; Future  -     Follow Up In Psychiatry; Future  PMDD (premenstrual dysphoric disorder)  -     Follow Up In Psychiatry  -     sertraline (Zoloft) 25 mg tablet; Take 3 tablets (75 mg) by mouth once daily.  -     Referral to Psychology; Future  -     Follow Up In Psychiatry; Future  Insomnia, unspecified type  -     traZODone (Desyrel) 50 mg tablet; Take 1 tablet (50 mg) by mouth as needed at bedtime for sleep.  -     Follow Up In Psychiatry; Future         MEDICAL-DECISION MAKING    -Patient educated and verbalized understanding on benefits, risks, and side effects of Trazodone.     Psych med regimen as follows:  - START Trazodone for sleep at night 25-50 mg nightly  -INCREASE to 75 mg a day for the 14 days before your period and 50 mg a day for the remaining days  -START Psychotherapy  -REFERRAL TO Psychology for talk therapy  -STOP Propanolol due to potential of lowering BP and recent Syncope episode.  -STAY hydrated to avoid Syncope  -READ attached information about the prescribed new medication   -CALL OFFICE IN CASE OF SIDE EFFECT TO SCHEDULE A VISIT FOR ASSESSMENT -[phone]    SI/HI ASSESSMENT  -Patient denied current suicidal ideation or thoughts of harm to self, denied homicidal ideation or thoughts of harm to others. No delusional thinking elicited. No perseverations or obsessions identified.      PLAN  -Continue Medications as directed.  -Follow-up with this provider in 8 weeks.  -Risks/benefits/assessment of medication interventions discussed with pt; pt agreeable to plan. Will continue to monitor for symptoms mgmt and SEs and adjust plan as needed.  -MI to increase coping skills/behavior regulation.  -Safety plan reviewed.  -Call  Psychiatry at [phone] with issues.  -For [facility] residents, [facility] is a 24/7 hotline you can call for assistance at [phone]) [phone]. Please call 911 or go to your closest Emergency Room if you feel worse. This includes thoughts of hurting yourself or anyone else, or having other troubles such as hearing voices, seeing visions, or having new and scary thoughts about the people around you.    OARRS:  TIN Rodriguez on 12/2/2024  3:12 PM  I have personally reviewed the OARRS report for Ashley Berardinelli. I have considered the risks of abuse, dependence, addiction and diversion  Medication is felt to be clinically appropriate based on documented diagnosis.      TIN Rodriguez  Record Review: extensive  CALL OFFICE IN CASE OF SIDE EFFECT TO SCHEDULE A VISIT FOR ASSESSMENT -[phone]  Follow up:   February 18th at 4 pm virtually  Time Spent:  Prep:   Direct time: 25 minutes  Documentation: 5  minutes  Total: 30 minutes

## 2025-01-02 ENCOUNTER — OFFICE VISIT (OUTPATIENT)
Dept: SURGERY | Facility: CLINIC | Age: 35
End: 2025-01-02
Payer: COMMERCIAL

## 2025-01-02 DIAGNOSIS — K82.8 BILIARY DYSKINESIA: Primary | ICD-10-CM

## 2025-01-02 PROCEDURE — 99024 POSTOP FOLLOW-UP VISIT: CPT | Performed by: SURGERY

## 2025-01-02 NOTE — PROGRESS NOTES
Subjective   Patient ID: Ashley Berardinelli is a 34 y.o. female who presents for Post-op (GB).  Patient has no complaints    HPI  Review of Systems  Physical Exam incision healed by the primary intention    Objective     No diagnosis found.   Patient Active Problem List   Diagnosis    Acute pharyngitis    Anxiety    ASCUS with positive high risk HPV cervical    Chronic low back pain    Chronic pain    Closed fracture of sacrum with routine healing    Back pain    Coccydynia    Fracture of coccyx    Tailbone injury    GERD (gastroesophageal reflux disease)    Lumbar radiculopathy    Lumbar spondylosis    Moderate episode of recurrent major depressive disorder    Neck pain    Oligomenorrhea    PCOS (polycystic ovarian syndrome)    Right hip pain    Scoliosis    Stress incontinence, female    Tobacco dependence    Vaginal discharge    Cervical atypism    Myopia    Fall at home    Annual physical exam    Elevated testosterone level in female      Allergies   Allergen Reactions    Augmentin [Amoxicillin-Pot Clavulanate] Wheezing    Sucralfate Hives, Rash and Unknown      Medication Documentation Review Audit       Reviewed by Paolo Reyna MD (Physician) on 25 at 0828      Medication Order Taking? Sig Documenting Provider Last Dose Status   esomeprazole (NexIUM) 20 mg DR capsule [id number]  Take 1 capsule (20 mg) by mouth once daily in the morning. Take before meals. Do not open capsule. Historical Provider, MD  Active   hydrOXYzine HCL (Atarax) 25 mg tablet [id number]  Take 2 tablets (50 mg) by mouth once daily at bedtime. TIN Rodriguez   24 235   metFORMIN (Glucophage) 500 mg tablet [id number] No Take 1 tablet (500 mg) by mouth 2 times daily (morning and late afternoon). Historical Provider, MD Taking Active   propranolol (Inderal) 10 mg tablet [id number]  Take 1 tablet (10 mg) by mouth once daily as needed (Anxiety attacks). TIN Rodriguez   24 2359   sertraline  (Zoloft) 25 mg tablet [id number]  Take 3 tablets (75 mg) by mouth once daily. TIN Rodriguez  Active   traZODone (Desyrel) 50 mg tablet [id number]  Take 1 tablet (50 mg) by mouth as needed at bedtime for sleep. TIN Rodriguez  Active   valACYclovir (Valtrex) 500 mg tablet [id number] No Take 1 tablet (500 mg) by mouth once daily. TIN Bravo Taking Active   Ventolin HFA 90 mcg/actuation inhaler [id number] No Inhale. Historical Provider, MD Taking Active                    Past Medical History:   Diagnosis Date    Difficulty in walking, not elsewhere classified 11/16/2020    Difficulty walking    Lumbago with sciatica, unspecified side 11/16/2020    Lumbago with sciatica    Obesity, unspecified 12/15/2013    Obesity    Personal history of other diseases of the nervous system and sense organs     History of chronic fatigue syndrome    Personal history of other infectious and parasitic diseases 07/05/2017    History of herpes zoster    Personal history of other specified conditions 07/21/2020    History of syncope     Social History     Tobacco Use   Smoking Status Every Day    Current packs/day: 1.00    Types: Cigarettes   Smokeless Tobacco Never     No family history on file.   Past Surgical History:   Procedure Laterality Date    MANDIBLE SURGERY  10/12/2015    Jaw Surgery    TONSILLECTOMY  10/12/2015    Tonsillectomy With Adenoidectomy       Assessment/Plan     This post cholecystectomy.  No evidence of surgical complications.  Follow-up as needed    Paolo Reyna MD

## 2025-02-02 ENCOUNTER — OFFICE VISIT (OUTPATIENT)
Dept: URGENT CARE | Age: 35
End: 2025-02-02
Payer: COMMERCIAL

## 2025-02-02 VITALS
TEMPERATURE: 98.4 F | OXYGEN SATURATION: 97 % | DIASTOLIC BLOOD PRESSURE: 88 MMHG | BODY MASS INDEX: 40.75 KG/M2 | HEIGHT: 63 IN | RESPIRATION RATE: 16 BRPM | SYSTOLIC BLOOD PRESSURE: 125 MMHG | HEART RATE: 71 BPM | WEIGHT: 230 LBS

## 2025-02-02 DIAGNOSIS — J10.1 INFLUENZA A: Primary | ICD-10-CM

## 2025-02-02 DIAGNOSIS — R05.9 COUGH, UNSPECIFIED TYPE: ICD-10-CM

## 2025-02-02 LAB
POC RAPID INFLUENZA A: POSITIVE
POC SARS-COV-2 AG BINAX: NORMAL

## 2025-02-02 PROCEDURE — 99203 OFFICE O/P NEW LOW 30 MIN: CPT | Performed by: PHYSICIAN ASSISTANT

## 2025-02-02 PROCEDURE — 87811 SARS-COV-2 COVID19 W/OPTIC: CPT | Performed by: PHYSICIAN ASSISTANT

## 2025-02-02 PROCEDURE — 87804 INFLUENZA ASSAY W/OPTIC: CPT | Performed by: PHYSICIAN ASSISTANT

## 2025-02-02 PROCEDURE — 3008F BODY MASS INDEX DOCD: CPT | Performed by: PHYSICIAN ASSISTANT

## 2025-02-02 RX ORDER — PROMETHAZINE HYDROCHLORIDE AND DEXTROMETHORPHAN HYDROBROMIDE 6.25; 15 MG/5ML; MG/5ML
5 SYRUP ORAL 4 TIMES DAILY PRN
Qty: 118 ML | Refills: 0 | Status: SHIPPED | OUTPATIENT
Start: 2025-02-02 | End: 2025-02-09

## 2025-02-02 RX ORDER — OSELTAMIVIR PHOSPHATE 75 MG/1
75 CAPSULE ORAL EVERY 12 HOURS
Qty: 10 CAPSULE | Refills: 0 | Status: SHIPPED | OUTPATIENT
Start: 2025-02-02 | End: 2025-02-07

## 2025-02-02 ASSESSMENT — PATIENT HEALTH QUESTIONNAIRE - PHQ9
2. FEELING DOWN, DEPRESSED OR HOPELESS: NOT AT ALL
1. LITTLE INTEREST OR PLEASURE IN DOING THINGS: NOT AT ALL
SUM OF ALL RESPONSES TO PHQ9 QUESTIONS 1 AND 2: 0

## 2025-02-02 ASSESSMENT — ENCOUNTER SYMPTOMS
COUGH: 1
FEVER: 1

## 2025-02-02 NOTE — LETTER
February 2, 2025     Patient: Ashley Berardinelli   YOB: 1990   Date of Visit: 2/2/2025       To Whom It May Concern:    Ashley Berardinelli was seen in my clinic on 2/2/2025 at 2:05 pm. Please excuse Gena for her absence from school on this day to make the appointment. Please excuse pt. From school/work over the next 24-48 hours and may return given fever free for 24 hours.     If you have any questions or concerns, please don't hesitate to call.         Sincerely,         Vashti Young PA-C        CC: No Recipients

## 2025-02-10 ENCOUNTER — TELEPHONE (OUTPATIENT)
Dept: BEHAVIORAL HEALTH | Facility: CLINIC | Age: 35
End: 2025-02-10
Payer: COMMERCIAL

## 2025-02-10 DIAGNOSIS — F33.41 RECURRENT MAJOR DEPRESSIVE DISORDER, IN PARTIAL REMISSION (CMS-HCC): ICD-10-CM

## 2025-02-10 RX ORDER — SERTRALINE HYDROCHLORIDE 100 MG/1
100 TABLET, FILM COATED ORAL DAILY
Qty: 90 TABLET | Refills: 0 | Status: SHIPPED | OUTPATIENT
Start: 2025-02-10 | End: 2025-05-11

## 2025-02-10 NOTE — PROGRESS NOTES
The BRIAN received an email reporting that the patient was experiencing suicidal thoughts on Friday. The BRIAN contacted the patient today Monday 2/10/2025, and she reported having suicidal thoughts without a plan on Friday. However, she stated that she no longer has any suicidal thoughts or plans at this time but continues to feel very depressed.    To ensure support, the BRIAN contacted the patient’s boyfriend with the patient's permission to confirm that he is present and monitoring her medications when picked up from the pharmacy. Additionally, the BRIAN increased the patient's Zoloft dosage from 75 mg to 100 mg to better address her depression.

## 2025-02-17 ENCOUNTER — APPOINTMENT (OUTPATIENT)
Dept: DERMATOLOGY | Facility: CLINIC | Age: 35
End: 2025-02-17
Payer: COMMERCIAL

## 2025-02-18 ENCOUNTER — APPOINTMENT (OUTPATIENT)
Dept: BEHAVIORAL HEALTH | Facility: CLINIC | Age: 35
End: 2025-02-18
Payer: COMMERCIAL

## 2025-02-18 DIAGNOSIS — F33.41 RECURRENT MAJOR DEPRESSIVE DISORDER, IN PARTIAL REMISSION (CMS-HCC): ICD-10-CM

## 2025-02-18 DIAGNOSIS — F41.1 GAD (GENERALIZED ANXIETY DISORDER): ICD-10-CM

## 2025-02-18 DIAGNOSIS — G47.00 INSOMNIA, UNSPECIFIED TYPE: ICD-10-CM

## 2025-02-18 DIAGNOSIS — F32.81 PMDD (PREMENSTRUAL DYSPHORIC DISORDER): ICD-10-CM

## 2025-02-18 PROCEDURE — 99214 OFFICE O/P EST MOD 30 MIN: CPT

## 2025-02-18 RX ORDER — TRAZODONE HYDROCHLORIDE 50 MG/1
50 TABLET ORAL NIGHTLY PRN
Qty: 90 TABLET | Refills: 0 | Status: SHIPPED | OUTPATIENT
Start: 2025-02-18 | End: 2025-05-19

## 2025-02-18 RX ORDER — HYDROXYZINE HYDROCHLORIDE 25 MG/1
50 TABLET, FILM COATED ORAL NIGHTLY
Qty: 180 TABLET | Refills: 0 | Status: SHIPPED | OUTPATIENT
Start: 2025-02-18 | End: 2025-05-19

## 2025-02-18 RX ORDER — ARIPIPRAZOLE 2 MG/1
2 TABLET ORAL DAILY
Qty: 90 TABLET | Refills: 0 | Status: SHIPPED | OUTPATIENT
Start: 2025-02-18 | End: 2025-05-19

## 2025-02-18 ASSESSMENT — ENCOUNTER SYMPTOMS
CONSTITUTIONAL NEGATIVE: 1
NERVOUS/ANXIOUS: 1

## 2025-02-18 NOTE — PROGRESS NOTES
"Impression :     I performed this visit using real-time telehealth tools, including an AUDIO/VIDEO connection between Ashley Berardinelli who is at Home and TIN Rodriguez who is at At [facility] office.  Virtual or Telephone Consent    An interactive audio and video telecommunication system which permits real time communications between the patient (at the originating site) and provider (at the distant site) was utilized to provide this telehealth service.   Verbal consent was requested and obtained from Ashley Berardinelli on this date, 02/18/25 for a telehealth visit.       Assessment/Plan     Impression:  Ashley Berardinelli is a 34 y.o. female who presents via telehealth visit for a follow up visit with CC of  \" I feel better than the last time. It just the PMDD that has been up and down. \"     Patient consents to treatment.    Problem List Items Addressed This Visit    None  Visit Diagnoses         Codes    Recurrent major depressive disorder, in partial remission (CMS-HCC)     F33.41    Relevant Medications    ARIPiprazole (Abilify) 2 mg tablet    Other Relevant Orders    Follow Up In Psychiatry    CAMERON (generalized anxiety disorder)     F41.1    Relevant Medications    hydrOXYzine HCL (Atarax) 25 mg tablet    Other Relevant Orders    Follow Up In Psychiatry    PMDD (premenstrual dysphoric disorder)     F32.81    Relevant Orders    Follow Up In Psychiatry    Insomnia, unspecified type     G47.00    Relevant Medications    traZODone (Desyrel) 50 mg tablet    Other Relevant Orders    Follow Up In Psychiatry            Patient is reminded that if there is SI to call [phone] and get themselves to the closest ED for evaluation, otherwise contact me for other questions/concerns.    Patient presenting to outpatient treatment for a scheduled psych follow up visit.      HPI   Background:   Patient presenting to outpatient treatment for a scheduled psych follow up visit.  Patient is at home for the " appointment.  Patient was seen a couple of weeks ago for management of  depression, and PMDD.   Last visit the dose of Zoloft was  increased  to 100 mg.  Patient reports the medication has partially helped with managing symptoms.   Patient hopes to feel better. Reports she is starting therapy.    Characteristics/Recent psychiatric symptoms (pertinent positives and negatives):    Patient reports still having symptoms of depression but there is an improvement from last time. Denies panic attacks but reports stress and increased worrying. Reports low energy and low motivation  Reports getting 6 hours of sleep at night. Appetite is okay.  Denies wishes for death or consideration for suicide.  CSSRS negative. Denies yajaira of psychosis. Denies hx hospitalization.   Patient does explain that current dose of Zoloft has NOT been successful in the management of anxiety and depressive symptoms.     -SI/HI : Denies        Psychiatric Review Of Systems:  Depressive Symptoms:  Low motivation and low energy  Manic Symptoms: negative    Anxiety Symptoms: Reports racing thoughts, stress and worry  Psychotic Symptoms: negative      REVIEW OF SYSTEMS  Review of Systems   Constitutional: Negative.    Psychiatric/Behavioral:  The patient is nervous/anxious.      All other ROS negative    Current Medications:    Current Outpatient Medications:     esomeprazole (NexIUM) 20 mg DR capsule, Take 1 capsule (20 mg) by mouth once daily in the morning. Take before meals. Do not open capsule., Disp: , Rfl:     metFORMIN (Glucophage) 500 mg tablet, Take 1 tablet (500 mg) by mouth 2 times daily (morning and late afternoon)., Disp: , Rfl:     propranolol (Inderal) 10 mg tablet, Take 1 tablet (10 mg) by mouth once daily as needed (Anxiety attacks)., Disp: 90 tablet, Rfl: 0    sertraline (Zoloft) 100 mg tablet, Take 1 tablet (100 mg) by mouth once daily., Disp: 90 tablet, Rfl: 0    valACYclovir (Valtrex) 500 mg tablet, Take 1 tablet (500 mg) by mouth  once daily., Disp: 90 tablet, Rfl: 3    Ventolin HFA 90 mcg/actuation inhaler, Inhale., Disp: , Rfl:     ARIPiprazole (Abilify) 2 mg tablet, Take 1 tablet (2 mg) by mouth once daily., Disp: 90 tablet, Rfl: 0    hydrOXYzine HCL (Atarax) 25 mg tablet, Take 2 tablets (50 mg) by mouth once daily at bedtime., Disp: 180 tablet, Rfl: 0    traZODone (Desyrel) 50 mg tablet, Take 1 tablet (50 mg) by mouth as needed at bedtime for sleep., Disp: 90 tablet, Rfl: 0     Medical History:  Past Medical History:   Diagnosis Date    Difficulty in walking, not elsewhere classified 11/16/2020    Difficulty walking    Lumbago with sciatica, unspecified side 11/16/2020    Lumbago with sciatica    Obesity, unspecified 12/15/2013    Obesity    Personal history of other diseases of the nervous system and sense organs     History of chronic fatigue syndrome    Personal history of other infectious and parasitic diseases 07/05/2017    History of herpes zoster    Personal history of other specified conditions 07/21/2020    History of syncope     Surgical History:  Past Surgical History:   Procedure Laterality Date    MANDIBLE SURGERY  10/12/2015    Jaw Surgery    TONSILLECTOMY  10/12/2015    Tonsillectomy With Adenoidectomy     Family History:  No family history on file.  Social History:  Social History     Socioeconomic History    Marital status: Single     Spouse name: Not on file    Number of children: Not on file    Years of education: Not on file    Highest education level: Not on file   Occupational History    Not on file   Tobacco Use    Smoking status: Every Day     Current packs/day: 1.00     Types: Cigarettes    Smokeless tobacco: Never   Vaping Use    Vaping status: Never Used   Substance and Sexual Activity    Alcohol use: Yes     Comment: socially once month    Drug use: Never    Sexual activity: Defer   Other Topics Concern    Not on file   Social History Narrative    Not on file     Social Drivers of Health     Financial Resource  "Strain: Not on file   Food Insecurity: Not on file   Transportation Needs: Not on file   Physical Activity: Not on file   Stress: Not on file   Social Connections: Not on file   Intimate Partner Violence: Not on file   Housing Stability: Not on file     Vitals:  There were no vitals filed for this visit.  Allergies:  Allergies   Allergen Reactions    Augmentin [Amoxicillin-Pot Clavulanate] Wheezing    Sucralfate Hives, Rash and Unknown       -PCP: No primary care provider on file.  -Pt reports currently is not pregnant  -TBI/head trauma/LOC/seizure hx: Denies    Objective   Appearance: Well groomed    Attitude: Calm, cooperative, and engaged in conversation.    Behavior: Appropriate eye contact.     Motor Activity: No psychomotor agitation or retardation. No abnormal movements, tremors or tics. No evidence of extrapyramidal symptoms or tardive dyskinesia.    Speech: Regular rate, rhythm, volume. Spontaneous, no pressured speech.    Mood:   \"Neutral, I am not super depressed, I am also not super happy. \"    Affect: Full range, mood congruent.    Thought Process: Linear, logical, and goal-directed. No loose associations or gross thought disorganization.    Thought Content: Denied current suicidal ideation or thoughts of harm to self, denied homicidal ideation or thoughts of harm to others. No delusional thinking elicited. No perseverations or obsessions identified.     Perception: Did not endorse auditory or visual hallucinations, did not appear to be responding to hallucinatory stimuli.     Cognition: Alert, oriented x3. Preserved attention span and concentration, recent and remote memory. Adequate fund of knowledge. No deficits in language.     Insight: Fair, in regards to understanding mental health condition    Judgement: Fair        Physical Exam      -Diagnoses and all orders for this visit:  Recurrent major depressive disorder, in partial remission (CMS-HCC)  -     Follow Up In Psychiatry  -     ARIPiprazole " (Abilify) 2 mg tablet; Take 1 tablet (2 mg) by mouth once daily.  -     Follow Up In Psychiatry; Future  CAMERON (generalized anxiety disorder)  -     Follow Up In Psychiatry  -     hydrOXYzine HCL (Atarax) 25 mg tablet; Take 2 tablets (50 mg) by mouth once daily at bedtime.  -     Follow Up In Psychiatry; Future  PMDD (premenstrual dysphoric disorder)  -     Follow Up In Psychiatry  -     Follow Up In Psychiatry; Future  Insomnia, unspecified type  -     Follow Up In Psychiatry  -     traZODone (Desyrel) 50 mg tablet; Take 1 tablet (50 mg) by mouth as needed at bedtime for sleep.  -     Follow Up In Psychiatry; Future         MEDICAL-DECISION MAKING    -Patient educated and verbalized understanding on benefits, risks, and side effects of Abilify.     Psych med regimen as follows:  - START Abilify 2 mg daily for depression  -CONTINUE current psychiatric medications as prescribed  -START Psychotherapy  -CONTINUE exercising and eating heathy diet  -Safety plan reviewed  -READ attached information about the prescribed new medication   -CALL OFFICE IN CASE OF SIDE EFFECT TO SCHEDULE A VISIT FOR ASSESSMENT -[phone]    /HI ASSESSMENT  -Patient denied current suicidal ideation or thoughts of harm to self, denied homicidal ideation or thoughts of harm to others. No delusional thinking elicited. No perseverations or obsessions identified.     PLAN  -Continue Medications as directed.  -Follow-up with this provider in 5 weeks.  -Risks/benefits/assessment of medication interventions discussed with pt; pt agreeable to plan. Will continue to monitor for symptoms mgmt and SEs and adjust plan as needed.  -MI to increase coping skills/behavior regulation.  -Safety plan reviewed.  -Call  Psychiatry at [phone] with issues.  -For [facility], [employer] is a 24/7 hotline you can call for assistance at [phone] 446-6922. Please call 911 or go to your closest Emergency Room if you feel worse. This includes  thoughts of hurting yourself or anyone else, or having other troubles such as hearing voices, seeing visions, or having new and scary thoughts about the people around you.    OARRS:  TIN Rodriguez on 2/18/2025  4:10 PM  I have personally reviewed the OARRS report for Ashley Berardinelli. I have considered the risks of abuse, dependence, addiction and diversion  Medication is felt to be clinically appropriate based on documented diagnosis.      TIN Rodriguez  Record Review: extensive  CALL OFFICE IN CASE OF SIDE EFFECT TO SCHEDULE A VISIT FOR ASSESSMENT -[phone]  Follow up:   March 27th at 0830 virtually  Time Spent:  Prep:   Direct time: 25 minutes  Documentation: 3  minutes  Total: 28 minutes

## 2025-02-19 ENCOUNTER — APPOINTMENT (OUTPATIENT)
Dept: OBSTETRICS AND GYNECOLOGY | Facility: CLINIC | Age: 35
End: 2025-02-19
Payer: COMMERCIAL

## 2025-03-12 DIAGNOSIS — F41.1 GAD (GENERALIZED ANXIETY DISORDER): ICD-10-CM

## 2025-03-12 DIAGNOSIS — Z79.899 MEDICATION MANAGEMENT: ICD-10-CM

## 2025-03-12 RX ORDER — CLONAZEPAM 0.5 MG/1
0.5 TABLET ORAL AS NEEDED
Qty: 16 TABLET | Refills: 0 | Status: SHIPPED | OUTPATIENT
Start: 2025-03-12 | End: 2025-03-12 | Stop reason: WASHOUT

## 2025-03-13 RX ORDER — SERTRALINE HYDROCHLORIDE 100 MG/1
150 TABLET, FILM COATED ORAL DAILY
Qty: 135 TABLET | Refills: 0 | Status: SHIPPED | OUTPATIENT
Start: 2025-03-13 | End: 2025-06-11

## 2025-03-27 ENCOUNTER — APPOINTMENT (OUTPATIENT)
Dept: BEHAVIORAL HEALTH | Facility: CLINIC | Age: 35
End: 2025-03-27
Payer: COMMERCIAL

## 2025-03-27 DIAGNOSIS — F41.1 GAD (GENERALIZED ANXIETY DISORDER): ICD-10-CM

## 2025-03-27 DIAGNOSIS — G47.00 INSOMNIA, UNSPECIFIED TYPE: ICD-10-CM

## 2025-03-27 DIAGNOSIS — F33.41 RECURRENT MAJOR DEPRESSIVE DISORDER, IN PARTIAL REMISSION (CMS-HCC): ICD-10-CM

## 2025-03-27 DIAGNOSIS — F32.81 PMDD (PREMENSTRUAL DYSPHORIC DISORDER): ICD-10-CM

## 2025-03-27 PROCEDURE — 99214 OFFICE O/P EST MOD 30 MIN: CPT

## 2025-03-27 RX ORDER — TRAZODONE HYDROCHLORIDE 50 MG/1
50 TABLET ORAL NIGHTLY
Qty: 90 TABLET | Refills: 1 | Status: SHIPPED | OUTPATIENT
Start: 2025-03-27 | End: 2025-09-23

## 2025-03-27 RX ORDER — HYDROXYZINE HYDROCHLORIDE 25 MG/1
25 TABLET, FILM COATED ORAL 3 TIMES DAILY PRN
Qty: 270 TABLET | Refills: 0 | Status: SHIPPED | OUTPATIENT
Start: 2025-03-27 | End: 2025-06-25

## 2025-03-27 RX ORDER — SERTRALINE HYDROCHLORIDE 100 MG/1
150 TABLET, FILM COATED ORAL DAILY
Qty: 135 TABLET | Refills: 1 | Status: SHIPPED | OUTPATIENT
Start: 2025-03-27 | End: 2025-09-23

## 2025-03-27 RX ORDER — PROPRANOLOL HYDROCHLORIDE 10 MG/1
10 TABLET ORAL DAILY PRN
Qty: 90 TABLET | Refills: 1 | Status: SHIPPED | OUTPATIENT
Start: 2025-03-27 | End: 2025-09-23

## 2025-03-27 ASSESSMENT — ENCOUNTER SYMPTOMS
NERVOUS/ANXIOUS: 1
CONSTITUTIONAL NEGATIVE: 1

## 2025-03-27 NOTE — PROGRESS NOTES
"Impression : Patient reports improvement in depressive symptoms on Zoloft 150 mg daily. Patient has PMDD, MDD, and CAMERON. Reports she still has increased worrying. BRIAN instructed patient to take Hydroxyzine 25 mg TID PRN for anxiety and continue Propranolol 10 mg PRN for panic attacks. Patient has been instructed to stay hydrated while on Propranolol.    I performed this visit using real-time telehealth tools, including an AUDIO/VIDEO connection between Ashley Berardinelli who is at Work and TIN Rodriguez who is at At home office working via Telehealth.  Virtual or Telephone Consent    An interactive audio and video telecommunication system which permits real time communications between the patient (at the originating site) and provider (at the distant site) was utilized to provide this telehealth service.   Verbal consent was requested and obtained from Ashley Berardinelli on this date, 03/27/25 for a telehealth visit and the patient's location was confirmed at the time of the visit.      Assessment/Plan     Impression:  Ashley Berardinelli is a 34 y.o. female who presents via telehealth visit for a follow up visit with CC of \" A few days ago around my period my anger got bad. \"    Patient consents to treatment.    Problem List Items Addressed This Visit    None  Visit Diagnoses         Codes    Recurrent major depressive disorder, in partial remission (CMS-HCC)     F33.41    Relevant Orders    Referral to Psychology    Follow Up In Psychiatry    CAMERON (generalized anxiety disorder)     F41.1    Relevant Medications    hydrOXYzine HCL (Atarax) 25 mg tablet    propranolol (Inderal) 10 mg tablet    sertraline (Zoloft) 100 mg tablet    Other Relevant Orders    Referral to Psychology    Follow Up In Psychiatry    PMDD (premenstrual dysphoric disorder)     F32.81    Relevant Orders    Referral to Psychology    Follow Up In Psychiatry    Insomnia, unspecified type     G47.00    Relevant Medications    traZODone " (Desyrel) 50 mg tablet    Other Relevant Orders    Follow Up In Psychiatry            Patient is reminded that if there is SI to call [phone] and get themselves to the closest ED for evaluation, otherwise contact me for other questions/concerns.    Patient presenting to outpatient treatment for a scheduled psych follow up visit.      HPI   Background:   Patient presenting to outpatient treatment for a scheduled psych follow up visit.  Patient is at work for the appointment.  Patient was seen a couple of weeks ago for management of PMDD, MDD, and CAMERON.   Last visit the dose of Zoloft was increased to 150 mg.  Patient reports the medication has helped with managing depression, but reports she is still having increased worrying.  Patient hopes to worry less.    Characteristics/Recent psychiatric symptoms (pertinent positives and negatives):    Patient reports still having symptoms of anxiety where she starts to sweat and worrying constantly. Denies depressive symptoms. Reports improvement in depressive symptoms and PMDD symptoms.  Reports getting 6-7 hours of sleep at night. Appetite is normal.  Denies wishes for death or consideration for suicide.  CSSRS negative. Denies yajaira of psychosis. Denies hx hospitalization.   Patient does explain that current dose of Zoloft has been successful in the management of depressive and PMDD symptoms.     -SI/HI : Denies        Psychiatric Review Of Systems:  Depressive Symptoms: negative  Manic Symptoms: negative    Anxiety Symptoms: General Anxiety Disorder (CAMERON)CAMERON Behaviors: difficult to control worry  Psychotic Symptoms: negative      REVIEW OF SYSTEMS  Review of Systems   Constitutional: Negative.    Psychiatric/Behavioral:  The patient is nervous/anxious.      All other ROS negative    Current Medications:    Current Outpatient Medications:     esomeprazole (NexIUM) 20 mg DR capsule, Take 1 capsule (20 mg) by mouth once daily in the morning. Take before meals. Do not open  capsule., Disp: , Rfl:     metFORMIN (Glucophage) 500 mg tablet, Take 1 tablet (500 mg) by mouth 2 times daily (morning and late afternoon)., Disp: , Rfl:     valACYclovir (Valtrex) 500 mg tablet, Take 1 tablet (500 mg) by mouth once daily., Disp: 90 tablet, Rfl: 3    Ventolin HFA 90 mcg/actuation inhaler, Inhale., Disp: , Rfl:     hydrOXYzine HCL (Atarax) 25 mg tablet, Take 1 tablet (25 mg) by mouth 3 times a day as needed for anxiety., Disp: 270 tablet, Rfl: 0    propranolol (Inderal) 10 mg tablet, Take 1 tablet (10 mg) by mouth once daily as needed (Anxiety attacks)., Disp: 90 tablet, Rfl: 1    sertraline (Zoloft) 100 mg tablet, Take 1.5 tablets (150 mg) by mouth once daily., Disp: 135 tablet, Rfl: 1    traZODone (Desyrel) 50 mg tablet, Take 1 tablet (50 mg) by mouth once daily at bedtime., Disp: 90 tablet, Rfl: 1     Medical History:  Past Medical History:   Diagnosis Date    Difficulty in walking, not elsewhere classified 11/16/2020    Difficulty walking    Lumbago with sciatica, unspecified side 11/16/2020    Lumbago with sciatica    Obesity, unspecified 12/15/2013    Obesity    Personal history of other diseases of the nervous system and sense organs     History of chronic fatigue syndrome    Personal history of other infectious and parasitic diseases 07/05/2017    History of herpes zoster    Personal history of other specified conditions 07/21/2020    History of syncope     Surgical History:  Past Surgical History:   Procedure Laterality Date    MANDIBLE SURGERY  10/12/2015    Jaw Surgery    TONSILLECTOMY  10/12/2015    Tonsillectomy With Adenoidectomy     Family History:  No family history on file.  Social History:  Social History     Socioeconomic History    Marital status: Single     Spouse name: Not on file    Number of children: Not on file    Years of education: Not on file    Highest education level: Not on file   Occupational History    Not on file   Tobacco Use    Smoking status: Every Day      "Current packs/day: 1.00     Types: Cigarettes    Smokeless tobacco: Never   Vaping Use    Vaping status: Never Used   Substance and Sexual Activity    Alcohol use: Yes     Comment: socially once month    Drug use: Never    Sexual activity: Defer   Other Topics Concern    Not on file   Social History Narrative    Not on file     Social Drivers of Health     Financial Resource Strain: Not on file   Food Insecurity: Not on file   Transportation Needs: Not on file   Physical Activity: Not on file   Stress: Not on file   Social Connections: Not on file   Intimate Partner Violence: Not on file   Housing Stability: Not on file     Vitals:  There were no vitals filed for this visit.  Allergies:  Allergies   Allergen Reactions    Augmentin [Amoxicillin-Pot Clavulanate] Wheezing    Abilify [Aripiprazole] Rash    Sucralfate Hives, Rash and Unknown       -PCP: No primary care provider on file.  -Pt reports currently is not pregnant  -TBI/head trauma/LOC/seizure hx: Denies    Objective   Appearance: Wellbutrin    Attitude: Cooperative, and engaged in conversation.    Behavior: Appropriate eye contact.     Motor Activity: No psychomotor agitation or retardation. No abnormal movements, tremors or tics. No evidence of extrapyramidal symptoms or tardive dyskinesia.    Speech: Regular rate, rhythm, volume. Spontaneous, no pressured speech.    Mood:  \" It is pretty good. \"    Affect: Full range, mood congruent.    Thought Process: Linear, logical, and goal-directed. No loose associations or gross thought disorganization.    Thought Content: Denied current suicidal ideation or thoughts of harm to self, denied homicidal ideation or thoughts of harm to others. No delusional thinking elicited. No perseverations or obsessions identified.     Perception: Did not endorse auditory or visual hallucinations, did not appear to be responding to hallucinatory stimuli.     Cognition: Alert, oriented x3. Preserved attention span and concentration, " recent and remote memory. Adequate fund of knowledge. No deficits in language.     Insight: Fair, in regards to understanding mental health condition    Judgement: Fair        Physical Exam      -Diagnoses and all orders for this visit:  Recurrent major depressive disorder, in partial remission (CMS-HCC)  -     Follow Up In Psychiatry  -     Referral to Psychology; Future  -     Follow Up In Psychiatry; Future  CAMERON (generalized anxiety disorder)  -     Follow Up In Psychiatry  -     Referral to Psychology; Future  -     hydrOXYzine HCL (Atarax) 25 mg tablet; Take 1 tablet (25 mg) by mouth 3 times a day as needed for anxiety.  -     propranolol (Inderal) 10 mg tablet; Take 1 tablet (10 mg) by mouth once daily as needed (Anxiety attacks).  -     sertraline (Zoloft) 100 mg tablet; Take 1.5 tablets (150 mg) by mouth once daily.  -     Follow Up In Psychiatry; Future  PMDD (premenstrual dysphoric disorder)  -     Follow Up In Psychiatry  -     Referral to Psychology; Future  -     Follow Up In Psychiatry; Future  Insomnia, unspecified type  -     Follow Up In Psychiatry  -     traZODone (Desyrel) 50 mg tablet; Take 1 tablet (50 mg) by mouth once daily at bedtime.  -     Follow Up In Psychiatry; Future         MEDICAL-DECISION MAKING    -Patient educated and verbalized understanding on benefits, risks, and side effects of current psychiatric medications.     Psych med regimen as follows:  - START taking Hydroxyzine 25 mg TID PRN for anxiety or nightly if drowsy   -CONTINUE current psychiatric medications as prescribed : Zoloft 150 mg daily for PMDD, MDD and CAMERON. Propranolol 10 mg daily PRN for panic attacks  -REFERRAL TO Psychology at  for psychotherapy  -START exercising and eating a healthy diet  -Safety plan reviewed  -READ attached information about the prescribed new medication   -CALL OFFICE IN CASE OF SIDE EFFECT TO SCHEDULE A VISIT FOR ASSESSMENT -[phone]    SI/HI ASSESSMENT  -Patient denied current  suicidal ideation or thoughts of harm to self, denied homicidal ideation or thoughts of harm to others. No delusional thinking elicited. No perseverations or obsessions identified.     PLAN  -Continue Medications as directed.  -Follow-up with this provider in 14 weeks.  -Risks/benefits/assessment of medication interventions discussed with pt; pt agreeable to plan. Will continue to monitor for symptoms mgmt and SEs and adjust plan as needed.  -MI to increase coping skills/behavior regulation.  -Safety plan reviewed.  -Call  Psychiatry at [phone] with issues.  -For [facility], [employer] is a 24/7 hotline you can call for assistance at [phone]) [phone]. Please call 911 or go to your closest Emergency Room if you feel worse. This includes thoughts of hurting yourself or anyone else, or having other troubles such as hearing voices, seeing visions, or having new and scary thoughts about the people around you.    OARRS:  TIN Rodriguez on 3/27/2025  8:50 AM  I have personally reviewed the OARRS report for Gena Augustineirasema. I have considered the risks of abuse, dependence, addiction and diversion  Medication is felt to be clinically appropriate based on documented diagnosis.      TIN Rodriguez  Record Review: extensive  CALL OFFICE IN CASE OF SIDE EFFECT TO SCHEDULE A VISIT FOR ASSESSMENT -[phone]  Follow up:   July 2nd at 0830 virtually  Time Spent:  Prep:   Direct time: 26 minutes  Documentation:  4 minutes  Total:30 minutes

## 2025-03-31 DIAGNOSIS — T14.8XXA PULLED MUSCLE: Primary | ICD-10-CM

## 2025-03-31 DIAGNOSIS — K21.00 GASTROESOPHAGEAL REFLUX DISEASE WITH ESOPHAGITIS WITHOUT HEMORRHAGE: ICD-10-CM

## 2025-03-31 RX ORDER — HYDROGEN PEROXIDE 3 %
20 SOLUTION, NON-ORAL MISCELLANEOUS
Qty: 90 CAPSULE | Refills: 3 | Status: SHIPPED | OUTPATIENT
Start: 2025-03-31 | End: 2026-03-31

## 2025-03-31 RX ORDER — FAMOTIDINE 20 MG/1
20 TABLET, FILM COATED ORAL 2 TIMES DAILY
Qty: 180 TABLET | Refills: 3 | Status: SHIPPED | OUTPATIENT
Start: 2025-03-31 | End: 2026-03-31

## 2025-03-31 RX ORDER — IBUPROFEN 800 MG/1
800 TABLET ORAL EVERY 8 HOURS PRN
Qty: 90 TABLET | Refills: 0 | Status: SHIPPED | OUTPATIENT
Start: 2025-03-31 | End: 2025-04-30

## 2025-03-31 RX ORDER — METHYLPREDNISOLONE 4 MG/1
TABLET ORAL
Qty: 21 TABLET | Refills: 0 | Status: SHIPPED | OUTPATIENT
Start: 2025-03-31 | End: 2025-04-03 | Stop reason: ALTCHOICE

## 2025-04-01 ENCOUNTER — ANCILLARY PROCEDURE (OUTPATIENT)
Dept: URGENT CARE | Age: 35
End: 2025-04-01
Payer: COMMERCIAL

## 2025-04-01 ENCOUNTER — OFFICE VISIT (OUTPATIENT)
Dept: URGENT CARE | Age: 35
End: 2025-04-01
Payer: COMMERCIAL

## 2025-04-01 VITALS
HEIGHT: 64 IN | BODY MASS INDEX: 39.27 KG/M2 | RESPIRATION RATE: 16 BRPM | OXYGEN SATURATION: 98 % | DIASTOLIC BLOOD PRESSURE: 84 MMHG | WEIGHT: 230 LBS | SYSTOLIC BLOOD PRESSURE: 131 MMHG | HEART RATE: 85 BPM | TEMPERATURE: 97.9 F

## 2025-04-01 DIAGNOSIS — M79.671 RIGHT FOOT PAIN: ICD-10-CM

## 2025-04-01 DIAGNOSIS — M79.671 RIGHT FOOT PAIN: Primary | ICD-10-CM

## 2025-04-01 PROCEDURE — 99213 OFFICE O/P EST LOW 20 MIN: CPT | Performed by: STUDENT IN AN ORGANIZED HEALTH CARE EDUCATION/TRAINING PROGRAM

## 2025-04-01 PROCEDURE — 73630 X-RAY EXAM OF FOOT: CPT | Mod: RIGHT SIDE | Performed by: STUDENT IN AN ORGANIZED HEALTH CARE EDUCATION/TRAINING PROGRAM

## 2025-04-01 PROCEDURE — 3008F BODY MASS INDEX DOCD: CPT | Performed by: STUDENT IN AN ORGANIZED HEALTH CARE EDUCATION/TRAINING PROGRAM

## 2025-04-01 RX ORDER — PREDNISONE 20 MG/1
40 TABLET ORAL DAILY
Qty: 10 TABLET | Refills: 0 | Status: SHIPPED | OUTPATIENT
Start: 2025-04-01 | End: 2025-04-03 | Stop reason: ALTCHOICE

## 2025-04-01 ASSESSMENT — ENCOUNTER SYMPTOMS
ARTHRALGIAS: 1
WOUND: 0

## 2025-04-01 ASSESSMENT — PAIN SCALES - GENERAL: PAINLEVEL_OUTOF10: 0-NO PAIN

## 2025-04-01 NOTE — LETTER
April 1, 2025     Patient: Ashley Berardinelli   YOB: 1990   Date of Visit: 4/1/2025       To Whom It May Concern:    Ashley Berardinelli was seen in my clinic on 4/1/2025 at 5:00 pm. Please excuse Gena for her absence from work on this day to make the appointment.  -Patient seen and evaluated office on 4/1/2025, please excuse absences from work due to condition, expect to return 4/4/2025, May return earlier if tolerable  If you have any questions or concerns, please don't hesitate to call.         Sincerely,         Derrek Romero PA-C        CC: No Recipients

## 2025-04-01 NOTE — PROGRESS NOTES
Subjective   Patient ID: Ashley Berardinelli is a 34 y.o. female. They present today with a chief complaint of Other (Patient states was cleaning for about 5 hrs 4 days ago then experiencing foot pain hard to walk. ).    History of Present Illness  HPI  Patient  presents to the Urgent Care for a chief complaint of right foot pain, no specific injury such as fall or trauma although patient believes it was related to cleaning as patient states said she was cleaning for 5 hours and did start to have pain patient has been taking ibuprofen no previous history of fractures or dislocation patient reports that she is able to ambulate and bear weight although painful  Past Medical History  Allergies as of 04/01/2025 - Reviewed 04/01/2025   Allergen Reaction Noted    Augmentin [amoxicillin-pot clavulanate] Wheezing 10/31/2024    Abilify [aripiprazole] Rash 03/12/2025    Sucralfate Hives, Rash, and Unknown 04/28/2023       (Not in a hospital admission)       Past Medical History:   Diagnosis Date    Difficulty in walking, not elsewhere classified 11/16/2020    Difficulty walking    Lumbago with sciatica, unspecified side 11/16/2020    Lumbago with sciatica    Obesity, unspecified 12/15/2013    Obesity    Personal history of other diseases of the nervous system and sense organs     History of chronic fatigue syndrome    Personal history of other infectious and parasitic diseases 07/05/2017    History of herpes zoster    Personal history of other specified conditions 07/21/2020    History of syncope       Past Surgical History:   Procedure Laterality Date    MANDIBLE SURGERY  10/12/2015    Jaw Surgery    TONSILLECTOMY  10/12/2015    Tonsillectomy With Adenoidectomy        reports that she has been smoking cigarettes. She has never used smokeless tobacco. She reports current alcohol use. She reports that she does not use drugs.    Review of Systems  Review of Systems   Musculoskeletal:  Positive for arthralgias.   Skin:  Negative  "for rash and wound.                                  Objective    Vitals:    04/01/25 1314   BP: 131/84   Pulse: 85   Resp: 16   Temp: 36.6 °C (97.9 °F)   SpO2: 98%   Weight: 104 kg (230 lb)   Height: 1.626 m (5' 4\")     Patient's last menstrual period was 02/25/2025.    Physical Exam  Vitals and nursing note reviewed.   Constitutional:       General: She is not in acute distress.     Appearance: Normal appearance. She is not ill-appearing, toxic-appearing or diaphoretic.   Cardiovascular:      Rate and Rhythm: Normal rate.   Pulmonary:      Effort: Pulmonary effort is normal. No respiratory distress.      Breath sounds: Normal breath sounds.   Musculoskeletal:         General: Swelling and tenderness present. No deformity.      Comments: No gross or obvious deformity, NV intact, cap refill brisk less than 2 seconds, able wriggle and splay toes out , able to curl toes, full sensation, able to ambulate and bear weight although antalgic gait   Skin:     Findings: No bruising, erythema, lesion or rash.   Neurological:      General: No focal deficit present.      Mental Status: She is alert and oriented to person, place, and time.   Psychiatric:         Mood and Affect: Mood normal.         Behavior: Behavior normal.         Procedures    Point of Care Test & Imaging Results from this visit  No results found for this visit on 04/01/25.   Imaging  Xray of right foot obtained to rule out acute osseous injury  Radiology impression: no definite acute osseus abnormality  Cardiology, Vascular, and Other Imaging  No other imaging results found for the past 2 days      Diagnostic study results (if any) were reviewed by Derrek Romero PA-C.    Assessment/Plan   Allergies, medications, history, and pertinent labs/EKGs/Imaging reviewed by Derrek Romero PA-C.     Medical Decision Making  Pt placed on a prednisone burst I did advise patient to avoid and said such as ibuprofen Advil Aleve or Motrin may take tylenol, if no " resolution or regression symptoms in five to seven days patient is to follow up with Podiatry or Orthopedics information provided, patient verbalize understanding agreeable to plan and discharge Urgent Care A+Ox4 stable condition no signs of distress    Orders and Diagnoses  Diagnoses and all orders for this visit:  Right foot pain  -     XR foot right 3+ views; Future  -     predniSONE (Deltasone) 20 mg tablet; Take 2 tablets (40 mg) by mouth once daily for 5 days.      Medical Admin Record      Patient disposition: Home    Electronically signed by Derrek Romero PA-C  1:53 PM

## 2025-04-01 NOTE — PATIENT INSTRUCTIONS
Please take  medication as prescribed. please avoid NSAIDs such as ibuprofen Advil Aleve or Motrin may take acetaminophen/tylenol,  if no resolution or aggressive symptoms in five to seven days may follow up with Podiatry or Orthopedics

## 2025-04-03 ENCOUNTER — HOSPITAL ENCOUNTER (OUTPATIENT)
Dept: RADIOLOGY | Facility: CLINIC | Age: 35
Discharge: HOME | End: 2025-04-03
Payer: COMMERCIAL

## 2025-04-03 ENCOUNTER — OFFICE VISIT (OUTPATIENT)
Facility: CLINIC | Age: 35
End: 2025-04-03
Payer: COMMERCIAL

## 2025-04-03 DIAGNOSIS — M79.671 RIGHT FOOT PAIN: ICD-10-CM

## 2025-04-03 DIAGNOSIS — M79.671 RIGHT FOOT PAIN: Primary | ICD-10-CM

## 2025-04-03 DIAGNOSIS — M65.971 TENOSYNOVITIS OF RIGHT FOOT: ICD-10-CM

## 2025-04-03 DIAGNOSIS — M54.16 LUMBAR RADICULOPATHY: ICD-10-CM

## 2025-04-03 PROCEDURE — 99204 OFFICE O/P NEW MOD 45 MIN: CPT | Performed by: PODIATRIST

## 2025-04-03 PROCEDURE — 73630 X-RAY EXAM OF FOOT: CPT | Mod: RT

## 2025-04-03 PROCEDURE — 73630 X-RAY EXAM OF FOOT: CPT | Mod: RIGHT SIDE | Performed by: RADIOLOGY

## 2025-04-03 RX ORDER — GABAPENTIN 100 MG/1
100 CAPSULE ORAL NIGHTLY
Qty: 30 CAPSULE | Refills: 11 | Status: SHIPPED | OUTPATIENT
Start: 2025-04-03 | End: 2026-04-03

## 2025-04-03 RX ORDER — CYCLOBENZAPRINE HCL 10 MG
10 TABLET ORAL 3 TIMES DAILY PRN
Qty: 30 TABLET | Refills: 0 | Status: SHIPPED | OUTPATIENT
Start: 2025-04-03 | End: 2025-05-03

## 2025-04-03 RX ORDER — DICLOFENAC SODIUM 75 MG/1
75 TABLET, DELAYED RELEASE ORAL 2 TIMES DAILY
Qty: 60 TABLET | Refills: 11 | Status: SHIPPED | OUTPATIENT
Start: 2025-04-03 | End: 2026-04-03

## 2025-04-03 NOTE — PROGRESS NOTES
History Of Present Illness  Ashley Berardinelli is a 34 y.o. female presenting today for evaluation of acute right foot pain.  She states that started after she was cleaning for 5 hours consistently.  She was seen in urgent care on 4/1/2025.  X-rays taken at that time do not show any acute fracture.  She was given prednisone but states this is making no difference in her pain and if anything her pain is worsening.  Review her chart does show she has chronic lower back pain with sciatica.  She has been on gabapentin in the past.  She presents today for evaluation and treatment options    PCP Rafa CASTLE  Last visit 2024     Past Medical History  She has a past medical history of Difficulty in walking, not elsewhere classified (11/16/2020), Lumbago with sciatica, unspecified side (11/16/2020), Obesity, unspecified (12/15/2013), Personal history of other diseases of the nervous system and sense organs, Personal history of other infectious and parasitic diseases (07/05/2017), and Personal history of other specified conditions (07/21/2020).    Surgical History  She has a past surgical history that includes Mandible surgery (10/12/2015) and Tonsillectomy (10/12/2015).     Social History  She reports that she has been smoking cigarettes. She has never used smokeless tobacco. She reports current alcohol use. She reports that she does not use drugs.    Family History  No family history on file.     Allergies  Augmentin [amoxicillin-pot clavulanate], Abilify [aripiprazole], and Sucralfate    Medications  Current Outpatient Medications   Medication Sig Dispense Refill    esomeprazole (NexIUM) 20 mg DR capsule Take 1 capsule (20 mg) by mouth once daily in the morning. Take before meals. Do not open capsule. 90 capsule 3    famotidine (Pepcid) 20 mg tablet Take 1 tablet (20 mg) by mouth 2 times a day. 180 tablet 3    hydrOXYzine HCL (Atarax) 25 mg tablet Take 1 tablet (25 mg) by mouth 3 times a day as needed for  anxiety. 270 tablet 0    ibuprofen 800 mg tablet Take 1 tablet (800 mg) by mouth every 8 hours if needed for mild pain (1 - 3) (pain). 90 tablet 0    metFORMIN (Glucophage) 500 mg tablet Take 1 tablet (500 mg) by mouth 2 times daily (morning and late afternoon).      methylPREDNISolone (Medrol Dospak) 4 mg tablets Take as directed on package. 21 tablet 0    predniSONE (Deltasone) 20 mg tablet Take 2 tablets (40 mg) by mouth once daily for 5 days. 10 tablet 0    propranolol (Inderal) 10 mg tablet Take 1 tablet (10 mg) by mouth once daily as needed (Anxiety attacks). 90 tablet 1    sertraline (Zoloft) 100 mg tablet Take 1.5 tablets (150 mg) by mouth once daily. 135 tablet 1    traZODone (Desyrel) 50 mg tablet Take 1 tablet (50 mg) by mouth once daily at bedtime. 90 tablet 1    valACYclovir (Valtrex) 500 mg tablet Take 1 tablet (500 mg) by mouth once daily. 90 tablet 3    Ventolin HFA 90 mcg/actuation inhaler Inhale.       No current facility-administered medications for this visit.       Review of Systems    REVIEW OF SYSTEMS  GENERAL:  Negative for malaise, significant weight loss, fever      Objective:   Vasc: DP and PT pulses are palpable bilateral.  CFT is less than 3 seconds bilateral.  Skin temperature is warm to cool proximal to distal bilateral.      Neuro:  Light touch is intact to the foot bilateral.  No evidence of CRPS    Derm: Nonpitting edema noted to the entire midfoot.  No fracture blisters.  No ecchymosis.  No evidence of infection    Ortho: Patient is limited range of motion to the right digits.  She is able to flex her foot dorsiflexed and plantarflexed.  She has diffuse to palpation through the entire midfoot and along the peroneal tendons.  No pain to the anterior ankle joint.  No pain over Lisfranc's ligament.    3 views the right foot independently interpreted reviewed 4/3/2025.  Impression: There is adductovarus rotation noted to the fourth and fifth digits.  No evidence of fracture or stress  fracture.  No joint dislocation.  Lisfranc joint is intact    Assessment/Plan     Diagnoses and all orders for this visit:  Right foot pain  -     XR foot right 3+ views; Future  -     Referral to Physical Therapy; Future  Lumbar radiculopathy  Tenosynovitis of right foot  -     Referral to Physical Therapy; Future      1.  Right foot pain with tenosynovitis  The patient has diffuse pain along the entire extensor complex and at the peroneal tendons.  No specific injury she can recall.  X-rays from urgent care and from today do not show any evidence of fracture, stress fracture or joint dislocation.  Is possible that her consistent cleaning did cause acute tendon pain.  I recommend she stop the prednisone and we will start prescription anti-inflammatory and muscle relaxer.  I have also recommended formal physical therapy as due to her underlying nerve issues she is at risk for developing complex regional pain syndrome.    2.  Lumbar radiculopathy  The patient has lower lumbar spine issues at baseline with lower lumbar radiculopathy.  It is possible she strained her lower back and sciatica which is causing the acute pain to the foot.  Again, she will work with physical therapy.  She will take gabapentin at night.  I will see her back in 3 weeks or sooner should problems arise.  Understands to call me should her pain worsen at any time.      Moderate Medical Decision Making     Diagnosis: New development of acute foot and ankle pain     Data Review:   · Review prior external medical records: Urgent care note  · Review of prior test results: X-ray report from urgent care  · Orders placed for additional workup: Referral placed to physical therapy  · Independent interpretation of a test performed by another provider: 3 views right foot from today independently interpreted        Plan and Associated Moderate Level of Risk:   · Prescription drug management: Stop prednisone, start diclofenac, muscle relaxer and  gabapentin             CHIN DykesM

## 2025-04-04 DIAGNOSIS — M79.671 ACUTE FOOT PAIN, RIGHT: ICD-10-CM

## 2025-04-04 DIAGNOSIS — G90.50 RSD (REFLEX SYMPATHETIC DYSTROPHY): Primary | ICD-10-CM

## 2025-04-04 NOTE — PROGRESS NOTES
Patient messaged the office today stating she is now using crutches because her pain is so bad.  She is concerned as she cannot go into physical therapy until 4/15/2025.  Her pain is not in proportion to an overuse injury.  I did discuss referral to pain management as well as MRI of the right foot given these findings.  She should keep her appointment for 4/15/2025.  We previously discussed the possibility of CRPS and I am concerned she may be developing this.  She should continue her gabapentin.

## 2025-04-07 ENCOUNTER — HOSPITAL ENCOUNTER (OUTPATIENT)
Dept: RADIOLOGY | Facility: CLINIC | Age: 35
Discharge: HOME | End: 2025-04-07
Payer: COMMERCIAL

## 2025-04-07 DIAGNOSIS — M79.671 ACUTE FOOT PAIN, RIGHT: ICD-10-CM

## 2025-04-07 PROCEDURE — 73718 MRI LOWER EXTREMITY W/O DYE: CPT | Mod: RT

## 2025-04-07 PROCEDURE — 73718 MRI LOWER EXTREMITY W/O DYE: CPT | Mod: RIGHT SIDE | Performed by: RADIOLOGY

## 2025-04-08 ENCOUNTER — APPOINTMENT (OUTPATIENT)
Dept: PAIN MEDICINE | Facility: CLINIC | Age: 35
End: 2025-04-08
Payer: COMMERCIAL

## 2025-04-08 DIAGNOSIS — M92.71 FREIBERG'S INFRACTION, RIGHT: Primary | ICD-10-CM

## 2025-04-09 ENCOUNTER — APPOINTMENT (OUTPATIENT)
Dept: ORTHOPEDIC SURGERY | Facility: CLINIC | Age: 35
End: 2025-04-09
Payer: COMMERCIAL

## 2025-04-09 ENCOUNTER — OFFICE VISIT (OUTPATIENT)
Dept: ORTHOPEDIC SURGERY | Facility: CLINIC | Age: 35
End: 2025-04-09
Payer: COMMERCIAL

## 2025-04-09 VITALS — HEIGHT: 64 IN | BODY MASS INDEX: 39.27 KG/M2 | WEIGHT: 230 LBS

## 2025-04-09 DIAGNOSIS — M79.671 RIGHT FOOT PAIN: Primary | ICD-10-CM

## 2025-04-09 DIAGNOSIS — M92.71 OSTEOCHONDROSIS OF HEAD OF SECOND METATARSAL BONE OF RIGHT FOOT: ICD-10-CM

## 2025-04-09 DIAGNOSIS — G90.529 COMPLEX REGIONAL PAIN SYNDROME TYPE 1 OF LOWER EXTREMITY, UNSPECIFIED LATERALITY: ICD-10-CM

## 2025-04-09 ASSESSMENT — PAIN DESCRIPTION - DESCRIPTORS: DESCRIPTORS: ACHING;SHARP;DULL

## 2025-04-09 ASSESSMENT — PAIN - FUNCTIONAL ASSESSMENT: PAIN_FUNCTIONAL_ASSESSMENT: 0-10

## 2025-04-09 ASSESSMENT — PAIN SCALES - GENERAL: PAINLEVEL_OUTOF10: 8

## 2025-04-09 NOTE — PROGRESS NOTES
34-year-old female here for right foot pain.  Essentially atraumatic right foot pain over the past 2 weeks.  She had she was cleaning her house for about 5 hours on a Sunday.  Next day she had severe pain and difficulty walking.  She went to an urgent care who ordered some steroids.  She was sent to a podiatrist who obtained x-rays and an MRI.  She was told to stop the prednisone after 2 tablets.  She works as a medical assistant.  She had no mobilization.  Has persistent pain despite using Ace wrap and wearing tennis shoes.  No diabetes.    On exam:  WD/WN obese female  A+O X3  NAD  No lymphedema  Inspection of both feet and ankles show symmetric arches.   Diffusely tender to palpation about the foot.  No focal tenderness across the metatarsal heads.  Tender from the dorsal midfoot down towards the lesser MTP joints.  5/5 strength in all 4 planes.  No crepitus.  Sensation intact to LT.   Good pulses.   Stable anterior drawer.  No peroneal subluxation.    (-) Daniel.     I personally reviewed the following radiographic exams: X-rays of both feet both weightbearing and nonweightbearing shows no acute changes.  Subtle subchondral lucency second metatarsal head with normal joint space.  MRI of right foot from ankle down towards the toes shows a small area of subchondral abnormality the second metatarsal head.  No surrounding edema.  No obvious active process.    Assessment: Right foot pain with small focus of AVN second metatarsal head, incidental?.  Possible CRPS.    Plan: Discussed nonoperative and operative options in detail.   Risk and benefits discussed in detail. All questions answered today.  Recovery timeline and expectations discussed in detail.  Her pain is really out of proportion of the objective findings on exam and x-ray/MRI imaging.  I believe someone probably canceled her prednisone over concern of long-term prednisone risk of AVN.  I think this osteonecrosis is old and really does not appear to be  active.  Her pain is really more diffuse and do not have a good handle on why she is so painful.  We discussed possible CRPS.  Recommend that she take her Medrol Dosepak that has been prescribed.  Will place her in a walking boot for support.  Will reevaluate next week.  Consider pain management referral.  New gabapentin.

## 2025-04-14 ENCOUNTER — OFFICE VISIT (OUTPATIENT)
Dept: PAIN MEDICINE | Facility: CLINIC | Age: 35
End: 2025-04-14
Payer: COMMERCIAL

## 2025-04-14 VITALS
WEIGHT: 230 LBS | DIASTOLIC BLOOD PRESSURE: 85 MMHG | OXYGEN SATURATION: 97 % | HEIGHT: 64 IN | RESPIRATION RATE: 18 BRPM | BODY MASS INDEX: 39.27 KG/M2 | HEART RATE: 92 BPM | SYSTOLIC BLOOD PRESSURE: 138 MMHG

## 2025-04-14 DIAGNOSIS — G90.50 RSD (REFLEX SYMPATHETIC DYSTROPHY): ICD-10-CM

## 2025-04-14 DIAGNOSIS — M79.671 RIGHT FOOT PAIN: Primary | ICD-10-CM

## 2025-04-14 PROBLEM — M65.971 TENOSYNOVITIS OF RIGHT FOOT: Status: ACTIVE | Noted: 2025-04-14

## 2025-04-14 PROCEDURE — 3008F BODY MASS INDEX DOCD: CPT | Performed by: ANESTHESIOLOGY

## 2025-04-14 PROCEDURE — 99214 OFFICE O/P EST MOD 30 MIN: CPT | Performed by: ANESTHESIOLOGY

## 2025-04-14 PROCEDURE — 99204 OFFICE O/P NEW MOD 45 MIN: CPT | Performed by: ANESTHESIOLOGY

## 2025-04-14 RX ORDER — GABAPENTIN 300 MG/1
CAPSULE ORAL
Qty: 90 CAPSULE | Refills: 3 | Status: SHIPPED | OUTPATIENT
Start: 2025-04-14 | End: 2025-05-24

## 2025-04-14 ASSESSMENT — ENCOUNTER SYMPTOMS
ADENOPATHY: 0
WEAKNESS: 0
DEPRESSION: 0
LOSS OF SENSATION IN FEET: 1
BLOOD IN STOOL: 0
DIFFICULTY URINATING: 0
SHORTNESS OF BREATH: 0
EYE PAIN: 0
OCCASIONAL FEELINGS OF UNSTEADINESS: 0
FEVER: 0
BACK PAIN: 1

## 2025-04-14 ASSESSMENT — PATIENT HEALTH QUESTIONNAIRE - PHQ9
SUM OF ALL RESPONSES TO PHQ9 QUESTIONS 1 AND 2: 0
2. FEELING DOWN, DEPRESSED OR HOPELESS: NOT AT ALL
1. LITTLE INTEREST OR PLEASURE IN DOING THINGS: NOT AT ALL

## 2025-04-14 ASSESSMENT — PAIN SCALES - GENERAL
PAINLEVEL_OUTOF10: 6
PAINLEVEL_OUTOF10: 6

## 2025-04-14 ASSESSMENT — LIFESTYLE VARIABLES
HOW OFTEN DO YOU HAVE A DRINK CONTAINING ALCOHOL: MONTHLY OR LESS
HOW OFTEN DO YOU HAVE SIX OR MORE DRINKS ON ONE OCCASION: NEVER
AUDIT-C TOTAL SCORE: 1
HOW MANY STANDARD DRINKS CONTAINING ALCOHOL DO YOU HAVE ON A TYPICAL DAY: 1 OR 2
SKIP TO QUESTIONS 9-10: 1

## 2025-04-14 ASSESSMENT — COLUMBIA-SUICIDE SEVERITY RATING SCALE - C-SSRS
6. HAVE YOU EVER DONE ANYTHING, STARTED TO DO ANYTHING, OR PREPARED TO DO ANYTHING TO END YOUR LIFE?: NO
2. HAVE YOU ACTUALLY HAD ANY THOUGHTS OF KILLING YOURSELF?: NO
1. IN THE PAST MONTH, HAVE YOU WISHED YOU WERE DEAD OR WISHED YOU COULD GO TO SLEEP AND NOT WAKE UP?: NO

## 2025-04-14 ASSESSMENT — PAIN - FUNCTIONAL ASSESSMENT: PAIN_FUNCTIONAL_ASSESSMENT: 0-10

## 2025-04-14 ASSESSMENT — PAIN DESCRIPTION - DESCRIPTORS: DESCRIPTORS: THROBBING;SHOOTING;ACHING

## 2025-04-14 NOTE — PROGRESS NOTES
Chief Complain    New Patient Visit (Pain 6/10 right foot started about 2.5 weeks to ago after cleaning pain didn't stop, went to Urgent care they are stating possible CRP. Right foot & ankle swelling. Difficulty standing and activity increases pain. Fractured back 2 years ago fell on ice.)    History Of Present Illness  Ashley Berardinelli is a 34 y.o. female here for evaluation of right foot pain. The patient has been experiencing these symptoms for last couple of week(s). The patient describes the pain as dull, aching and throbbing. The patient's current pain score is 6 on a scale from 0-10. The pain is worsened by  weight bearing and activities   and is alleviated by ice. Since the start of the symptoms the pain has been unchanged.    The patient denies any fever, chills, weight loss, weakness, numbness, bladder/ bowel incontinence, history of cancer, history of IV drug abuse, recent trauma.    Past Medical History  She has a past medical history of Anxiety, Chronic pain disorder, Depression (2003), Difficulty in walking, not elsewhere classified (11/16/2020), Lumbago with sciatica, unspecified side (11/16/2020), Obesity, unspecified (12/15/2013), Panic attack (2020), Personal history of other diseases of the nervous system and sense organs, Personal history of other infectious and parasitic diseases (07/05/2017), Personal history of other specified conditions (07/21/2020), Sleep difficulties (2022), and Suicide attempt (Multi) (2004).    Surgical History  She has a past surgical history that includes Mandible surgery (10/12/2015) and Tonsillectomy (10/12/2015).    Social History  She reports that she has been smoking cigarettes. She has never used smokeless tobacco. She reports current alcohol use. She reports that she does not use drugs.    Family History  Family History   Problem Relation Name Age of Onset    Alcohol abuse Maternal Grandfather Kd Ricci     Dementia Maternal Grandmother Annmarie Wynne      Depression Sister [facility]     Self-Injury Sister [facility]     Suicide Attempts Sister [facility]     Depression Sister [facility]     Self-Injury Sister [facility]     Suicide Attempts Sister [facility]     Depression Sister [facility]     Self-Injury Sister [facility]     Suicide Attempts Sister [facility]         Allergies  Augmentin [amoxicillin-pot clavulanate], Abilify [aripiprazole], and Sucralfate    Review of Systems  Review of Systems   Constitutional:  Negative for fever.   HENT:  Negative for ear pain.    Eyes:  Negative for pain.   Respiratory:  Negative for shortness of breath.    Cardiovascular:  Negative for chest pain.   Gastrointestinal:  Negative for blood in stool.   Endocrine: Negative for polyuria.   Genitourinary:  Negative for difficulty urinating.   Musculoskeletal:  Positive for back pain.   Skin:  Negative for rash.   Allergic/Immunologic: Negative for food allergies.   Neurological:  Negative for weakness.   Hematological:  Negative for adenopathy.   Psychiatric/Behavioral:  Negative for suicidal ideas.         Physical Exam  Physical Exam  Constitutional:       Appearance: Normal appearance.   HENT:      Head: Normocephalic and atraumatic.   Eyes:      Extraocular Movements: Extraocular movements intact.      Pupils: Pupils are equal, round, and reactive to light.   Cardiovascular:      Rate and Rhythm: Normal rate and regular rhythm.   Pulmonary:      Effort: Pulmonary effort is normal.   Abdominal:      Palpations: Abdomen is soft.   Musculoskeletal:      Cervical back: Neck supple.   Skin:     General: Skin is warm.      Capillary Refill: Capillary refill takes more than 3 seconds.   Neurological:      General: No focal deficit present.      Mental Status: She is alert and oriented to person, place, and time.      Motor: Motor function is intact.      Deep Tendon Reflexes:      Reflex Scores:       Patellar reflexes are 2+ on the right side and 2+ on the left  "side.       Achilles reflexes are 1+ on the right side and 1+ on the left side.  Psychiatric:         Mood and Affect: Mood normal.         Behavior: Behavior normal.           Last Recorded Vitals  Blood pressure 138/85, pulse 92, resp. rate 18, height 1.626 m (5' 4\"), weight 104 kg (230 lb), last menstrual period 02/25/2025, SpO2 97%.    Reviewed Images  Reviewed and independently interpreted MRI Lumbar spine done in 2022 disc bulge L3-4 as well as annular tear, no significant spinal canal or neural foraminal stenosis    Reviewed Labs  [unfilled]      Assessment/Plan   Encounter Diagnosis   Name Primary?    RSD (reflex sympathetic dystrophy)         Ashley Berardinelli is a 34 y.o. female here for evaluation of right lateral and top of her foot pain, she has been experiencing the symptoms the last couple of weeks.  She denies any significant trauma or inciting event.  The pain is worse with weightbearing, walking improved with sitting and icing.  She was seen by orthopedics, podiatry and had an MRI done of her foot she was diagnosed of having RSD.  She has been scheduled for a physical therapy.  She also been started on gabapentin 100 mg which has not been helpful.  On physical examination today she does not have any focal neurological deficit, she does not have any obvious allodynia or any other trophic changes.  She does have poorly palpable pedal pulses, she does have history of smoking for last 20 years or so.  I would recommend getting an ultrasound with SHERRIE for further evaluation and rule out any peripheral vascular disease.  If negative then I would suggest her starting the physical therapy if she does not respond would consider getting an MRI of her lumbar spine to rule out any nerve compression.  Meanwhile I would increase the dose of gabapentin to 300 mg 3 times daily.  Risk benefits and alternatives were discussed.    Salazar Ferreira MD  "

## 2025-04-14 NOTE — PROGRESS NOTES
"PHYSICAL THERAPY EVALUATION AND TREATMENT    Patient Name: Ashley Berardinelli  MRN: [id number]  Today's Date: 4/15/2025                              Insurance:  Visit number: 1 (updated 04/14/25)  Insurance Type: Payor:  EMPLOYEE MEDICAL PLAN / Plan:  EMPLOYEE MEDICAL PLAN TRADITIONAL  / Product Type: *No Product type* / $25 COPAY 30 PT/OT V PCY 0 USED NEEDS AUTH CONTIGO PAYS % OOP 1600.00 297.67 APPLIED   Authorization or Plan of Care date Range: TBD  Copay: $25  Surgery: No surgery found, No surgery found.  Days since surgery: No surgery found   Referred by: Cayla Ward DPM     Assessment:        Ashley Berardinelli  is a 34 y.o. old patient who participated in a physical therapy evaluation today due to ***. Gena presents with signs and symptoms consistent with ***. Gena's impairments include: {talimpair:23556}.   Due to these impairments, she has the following functional limitations and participation restrictions: {talfl:88291}. Gena's clinical presentation is {talClinicalPresentation:61956::\"Stable and/or uncomplicated characteristics\"} with the level of complexity being {talcomplexity:48761::\"low\"}. Gena's potential for rehab is {talprognosis:94073::\"good\"}.  Skilled physical therapy services are appropriate and beneficial in order to achieve measurable and meaningful change in the objective tests and measures. Utilization of skilled physical therapy services will aid in advancing her functional status and attaining her therapy-related goals. Gena verbalized understanding and is in agreement with all goals and plan of care.  Gena left session with all questions answered and no increase in symptoms.      Plan:       NV: ***    Therapy Diagnosis:   Problem List Items Addressed This Visit             ICD-10-CM    Right foot pain - Primary M79.671    Tenosynovitis of right foot M65.971       Subjective    Ashley Berardinelli  is a 34 y.o. female  presenting to the clinic with chief " "complaint of     Onset: ***    SEMAJ: ***    Pain location:  ***  Pain description:  ***   ***/10 at worst; ***/10 at best    Worse with:  ***  Better with:  ***    Denies N/T   Denies radiating pain   Denies bowel/bladder incontinence    Prior treatments/interventions:  ***    Home: ***  PLOF: ***  CLOF: ***  Functional limitations: ***  Pt's goal: \" ***\"     Work: ***  Exercise: ***  Hobbies: ***    Diagnostic images:   4/3/25: X-RAY of R foot - No acute abnormality in the right foot   4/7/25: MRI of R foot - Small region of serpiginous subchondral signal abnormality of the 2nd metatarsal head favored to represent a focus of osteonecrosis. Freiberg's infraction also a consideration.    Medical History Form: Reviewed (scanned into chart)    Precautions:  Fall Risk: {Fall Risk:78173::\"None\"}    + ***  Denies: CA, pacemaker, DM, HTN, blood thinner medication use, latex allergy, epilepsy/seizures, or other known cardio/neuro/pulmonary problems   Denies unexpected weight loss/gain, night sweats, or night pain.    Previous surgeries include:  ***    Objective   Outcome Measures:    Posture: {POSTURE 1 PMR:79689}  Transfers: {mclassist:66362::\"IND\"}  Bed Mobility: {mclassist:66249::\"IND\"}  Gait: Pt ambulates {mclassist:62382::\"IND\"} with {mcldevice:50312::\"no assistive device\"}. Demos {mclgait:16575}. ***-reciprocal gait pattern observed.  Stairs: Pt ascends/descends 4 x 6\" steps with {0-2:72621} Hrs {mclassist:87813::\"IND\"}. ***-reciprocal step pattern observed.    Dermatomes/Sensation BLE:  Mid-Anterior Thigh (L2) L: {INTACT/IMPAIRED:00893}, R: {INTACT/IMPAIRED:46654}  Medial Knee (L3) L: {INTACT/IMPAIRED:13436}, R: {INTACT/IMPAIRED:16169}  Medial Malleolus (L4) L: {INTACT/IMPAIRED:21233}, R: {INTACT/IMPAIRED:21233}  Dorsal Second Toe Web Space (L5) L: {INTACT/IMPAIRED:21233}, R: {INTACT/IMPAIRED:21233}  Lateral Malleolus (S1) L: {INTACT/IMPAIRED:21233}, R: {INTACT/IMPAIRED:21233}  Popliteal Fossa (S2) L: " "{INTACT/IMPAIRED:21233}, R: {INTACT/IMPAIRED:21233}     Myotomes/Strength (MMT in sitting):  Hip Flex (L2) L: {MMT 0-5:73999::\"5\"} , R: {MMT 0-5:48215::\"5\"}  Hip Add L: {MMT 0-5:22769::\"5\"} , R: {MMT 0-5:87719::\"5\"}  Hip Abd L: {MMT 0-5:81710::\"5\"} , R: {MMT 0-5:63855::\"5\"}  Hip Ext L: {MMT 0-5:74626::\"5\"} , R: {MMT 0-5:93624::\"5\"}  Knee Ext (L3) L: {MMT 0-5:68966::\"5\"} , R: {MMT 0-5:71255::\"5\"}  Knee Flex L: {MMT 0-5:82646::\"5\"} , R: {MMT 0-5:06356::\"5\"}  Great Toe Extension (L5) L: {MMT 0-5:16708::\"5\"} , R: {MMT 0-5:25114::\"5\"}  Ankle DF L: {MMT 0-5:30378::\"5\"} , R: {MMT 0-5:55682::\"5\"}  Ankle PF (S1) L: {MMT 0-5:90619::\"5\"} , R: {MMT 0-5:58524::\"5\"}  Ankle Eversion L: {MMT 0-5:11557::\"5\"} , R: {MMT 0-5:28205::\"5\"}  Ankle Inversion L: {MMT 0-5:10264::\"5\"} , R: {MMT 0-5:96091::\"5\"}    Range of Motion:  Ankle DF R/L: ***  Ankle PF R/L: ***  Ankle IV R/L: ***  Ankle EV R/L: ***  Great Toe Extension R/L: ***    Skin condition: ***    Edema: ***    Palpation: ***    Balance:   SLS (seconds) R/L: ***    Treatments:    Assigned HEP- [facility] ***    {talbillingoptions:28680}    EDUCATION:       -Educated Gena  on the role of PT and PT POC  -Educated Gena regarding benefit and purpose of skilled PT services along with results of examination findings and how this correlates to their chief complaint.   -Answered Gena's questions in full.  -Educated Gena on relevant anatomy and the rationale for exercises  -Ashley Berardinelli advised to hold any ex if it increases pain, Ashley Berardinelli verbalized understanding    Goals:        STG's (within 2 weeks)  Ashley Berardinelli will decrease pain by >/= 2/10 points from baseline to improve ability to perform household/recreational activities.    Ashley Berardinelli will be able to sleep through the night with less than 2 interruptions due to pain in order to improve mental and physical well-being in order to safely participate in ADLs.     Ashley Berardinelli will " demonstrate independence,  excellent understanding of and report compliance with at least 3 exercises in her HEP to facilitate the learning process to maximize PT benefits and to facilitate independent rehab program upon discharge.    LTG's (by discharge)    Patient will improve *** ankle inversion & eversion strength to >/=4+/5 for improved ankle and foot stability with ambulation.    Patient will improve *** ankle plantarflexion and dorsiflexion strength to >/=4+/5 for improved propulsion with gait.    Patient will report 0/10 pain with ambulation.    Patient will improve 1st MT extension strength to >/=4+/5 for improved push-off gait mechanics with ambulation.    Patient will improve LEFS score to by 9 points (MDC) to demonstrate significant improvement in the ability to complete household and community functional tasks with the lower extremity independently.    Ashley Berardinelli will decrease pain to 0-2/10 to improve ability to perform household/recreational activities.    Ashley Berardinelli will be able to sleep through the night without waking due to pain in order to improve mental and physical well-being in order to safely participate in ADLs.     Ashley Berardinelli will demonstrate independence,  excellent understanding of and report compliance with HEP to facilitate the learning process to maximize PT benefits and to facilitate independent rehab program upon discharge.

## 2025-04-15 ENCOUNTER — APPOINTMENT (OUTPATIENT)
Dept: PHYSICAL THERAPY | Facility: CLINIC | Age: 35
End: 2025-04-15
Payer: COMMERCIAL

## 2025-04-16 ENCOUNTER — APPOINTMENT (OUTPATIENT)
Dept: ORTHOPEDIC SURGERY | Facility: CLINIC | Age: 35
End: 2025-04-16
Payer: COMMERCIAL

## 2025-04-22 ENCOUNTER — APPOINTMENT (OUTPATIENT)
Dept: PODIATRY | Facility: CLINIC | Age: 35
End: 2025-04-22
Payer: COMMERCIAL

## 2025-04-24 ENCOUNTER — HOSPITAL ENCOUNTER (OUTPATIENT)
Dept: VASCULAR MEDICINE | Facility: CLINIC | Age: 35
Discharge: HOME | End: 2025-04-24
Payer: COMMERCIAL

## 2025-04-24 DIAGNOSIS — G90.50 RSD (REFLEX SYMPATHETIC DYSTROPHY): ICD-10-CM

## 2025-04-24 PROCEDURE — 93922 UPR/L XTREMITY ART 2 LEVELS: CPT | Performed by: SURGERY

## 2025-04-24 PROCEDURE — 93922 UPR/L XTREMITY ART 2 LEVELS: CPT

## 2025-04-25 ENCOUNTER — OFFICE VISIT (OUTPATIENT)
Dept: PAIN MEDICINE | Facility: CLINIC | Age: 35
End: 2025-04-25
Payer: COMMERCIAL

## 2025-04-25 VITALS
WEIGHT: 230 LBS | DIASTOLIC BLOOD PRESSURE: 93 MMHG | HEART RATE: 113 BPM | TEMPERATURE: 97.2 F | RESPIRATION RATE: 18 BRPM | HEIGHT: 64 IN | SYSTOLIC BLOOD PRESSURE: 125 MMHG | BODY MASS INDEX: 39.27 KG/M2 | OXYGEN SATURATION: 95 %

## 2025-04-25 DIAGNOSIS — M79.671 RIGHT FOOT PAIN: Primary | ICD-10-CM

## 2025-04-25 DIAGNOSIS — M79.2 NEUROPATHIC PAIN: ICD-10-CM

## 2025-04-25 PROCEDURE — 99213 OFFICE O/P EST LOW 20 MIN: CPT | Performed by: ANESTHESIOLOGY

## 2025-04-25 PROCEDURE — 3008F BODY MASS INDEX DOCD: CPT | Performed by: ANESTHESIOLOGY

## 2025-04-25 ASSESSMENT — PAIN SCALES - GENERAL
PAINLEVEL_OUTOF10: 5 - MODERATE PAIN
PAINLEVEL_OUTOF10: 5

## 2025-04-25 ASSESSMENT — PAIN DESCRIPTION - DESCRIPTORS: DESCRIPTORS: ACHING;SPASM

## 2025-04-25 ASSESSMENT — COLUMBIA-SUICIDE SEVERITY RATING SCALE - C-SSRS
2. HAVE YOU ACTUALLY HAD ANY THOUGHTS OF KILLING YOURSELF?: NO
6. HAVE YOU EVER DONE ANYTHING, STARTED TO DO ANYTHING, OR PREPARED TO DO ANYTHING TO END YOUR LIFE?: NO
1. IN THE PAST MONTH, HAVE YOU WISHED YOU WERE DEAD OR WISHED YOU COULD GO TO SLEEP AND NOT WAKE UP?: NO

## 2025-04-25 ASSESSMENT — ENCOUNTER SYMPTOMS
LOSS OF SENSATION IN FEET: 0
BLOOD IN STOOL: 0
DEPRESSION: 0
CONSTIPATION: 0
OCCASIONAL FEELINGS OF UNSTEADINESS: 1
SHORTNESS OF BREATH: 0

## 2025-04-25 ASSESSMENT — PAIN - FUNCTIONAL ASSESSMENT: PAIN_FUNCTIONAL_ASSESSMENT: 0-10

## 2025-04-25 NOTE — PROGRESS NOTES
Chief Complain    Follow-up (For pain in right foot, would like to discuss what's next. Still having swelling in ankle.)    History Of Present Illness  Ashley Berardinelli is a 34 y.o. female here for follow-up of right foot pain. The patient has been experiencing these symptoms for last couple of week(s). The patient describes the pain as dull, aching and throbbing. The patient's current pain score is 6 on a scale from 0-10. The pain is worsened by  weight bearing and activities   and is alleviated by ice. Since the start of the symptoms the pain has been unchanged.        Past Medical History  She has a past medical history of Anxiety, Chronic pain disorder, Depression (2003), Difficulty in walking, not elsewhere classified (11/16/2020), Lumbago with sciatica, unspecified side (11/16/2020), Obesity, unspecified (12/15/2013), Panic attack (2020), Personal history of other diseases of the nervous system and sense organs, Personal history of other infectious and parasitic diseases (07/05/2017), Personal history of other specified conditions (07/21/2020), Sleep difficulties (2022), and Suicide attempt (Multi) (2004).    Surgical History  She has a past surgical history that includes Mandible surgery (10/12/2015) and Tonsillectomy (10/12/2015).    Social History  She reports that she has been smoking cigarettes. She has never used smokeless tobacco. She reports current alcohol use. She reports that she does not use drugs.    Family History  Family History   Problem Relation Name Age of Onset    Alcohol abuse Maternal Grandfather Kd Wynne     Dementia Maternal Grandmother Annmarie Ricci     Depression Sister Candy Oliver     Self-Injury Sister Candy Oliver     Suicide Attempts Sister Candy Oliver     Depression Sister Candy Oliver     Self-Injury Sister Candy Oliver     Suicide Attempts Sister Candy Oliver     Depression Sister Candy Oliver     Self-Injury Sister Candy Oliver     Suicide Attempts Sister Candy Oliver  "        Allergies  Augmentin [amoxicillin-pot clavulanate], Abilify [aripiprazole], and Sucralfate    Review of Systems  Review of Systems   Respiratory:  Negative for shortness of breath.    Cardiovascular:  Negative for chest pain.   Gastrointestinal:  Negative for blood in stool and constipation.   Psychiatric/Behavioral:  Negative for suicidal ideas.         Physical Exam  Physical Exam  Constitutional:       Appearance: Normal appearance.   Eyes:      Extraocular Movements: Extraocular movements intact.      Pupils: Pupils are equal, round, and reactive to light.   Cardiovascular:      Rate and Rhythm: Normal rate and regular rhythm.   Abdominal:      Palpations: Abdomen is soft.   Neurological:      Mental Status: She is alert and oriented to person, place, and time.   Psychiatric:         Mood and Affect: Mood normal.         Behavior: Behavior normal.           Last Recorded Vitals  Blood pressure (!) 125/93, pulse (!) 113, temperature 36.2 °C (97.2 °F), resp. rate 18, height 1.626 m (5' 4\"), weight 104 kg (230 lb), last menstrual period 02/25/2025, SpO2 95%.    Reviewed vascular US:  Right Lower PVR: No evidence of arterial occlusive disease in the right lower extremity at rest. Multiphasic flow is noted in the right dorsalis pedis artery, right posterior tibial artery and right common femoral artery.    Left Lower PVR: No evidence of arterial occlusive disease in the left lower extremity at rest. Multiphasic flow is noted in the left common femoral artery, left posterior tibial artery and left dorsalis pedis artery.          Assessment/Plan   Encounter Diagnoses   Name Primary?    Right foot pain Yes    Neuropathic pain           Ashley Berardinelli is a 34 y.o. female here for evaluation of right lateral and top of her foot pain, she has been experiencing the symptoms the last couple of weeks.  She denies any significant trauma or inciting event.  The pain is worse with weightbearing, walking improved " with sitting and icing.  She was seen by orthopedics, podiatry and had an MRI done of her foot she was diagnosed of having RSD.  She will be ordered an ultrasound of her lower extremity rule out any peripheral vascular disease which is negative.  She has started 300 mg of gabapentin at night.  I recommended activity modification keeping her leg elevated and starting physical therapy.  She may increase the dose of gabapentin to 300 mg twice daily.  If she does not respond would get an MRI of her lumbar spine to rule out any nerve root impingement.      Salazar Ferreira MD

## 2025-05-06 DIAGNOSIS — F33.2 SEVERE EPISODE OF RECURRENT MAJOR DEPRESSIVE DISORDER, WITHOUT PSYCHOTIC FEATURES (MULTI): ICD-10-CM

## 2025-05-06 RX ORDER — ARIPIPRAZOLE 5 MG/1
5 TABLET ORAL DAILY
Qty: 30 TABLET | Refills: 1 | Status: SHIPPED | OUTPATIENT
Start: 2025-05-06 | End: 2025-07-05

## 2025-05-07 ENCOUNTER — EVALUATION (OUTPATIENT)
Dept: PHYSICAL THERAPY | Facility: CLINIC | Age: 35
End: 2025-05-07
Payer: COMMERCIAL

## 2025-05-07 ENCOUNTER — DOCUMENTATION (OUTPATIENT)
Dept: PHYSICAL THERAPY | Facility: CLINIC | Age: 35
End: 2025-05-07
Payer: COMMERCIAL

## 2025-05-07 DIAGNOSIS — M79.671 RIGHT FOOT PAIN: Primary | ICD-10-CM

## 2025-05-07 DIAGNOSIS — M65.971 TENOSYNOVITIS OF RIGHT FOOT: ICD-10-CM

## 2025-05-13 ENCOUNTER — APPOINTMENT (OUTPATIENT)
Dept: PHYSICAL THERAPY | Facility: CLINIC | Age: 35
End: 2025-05-13
Payer: COMMERCIAL

## 2025-05-13 DIAGNOSIS — M65.971 TENOSYNOVITIS OF RIGHT FOOT: ICD-10-CM

## 2025-05-13 DIAGNOSIS — F33.9 EPISODE OF RECURRENT MAJOR DEPRESSIVE DISORDER, UNSPECIFIED DEPRESSION EPISODE SEVERITY: ICD-10-CM

## 2025-05-13 DIAGNOSIS — F32.81 PMDD (PREMENSTRUAL DYSPHORIC DISORDER): ICD-10-CM

## 2025-05-13 DIAGNOSIS — M79.671 RIGHT FOOT PAIN: Primary | ICD-10-CM

## 2025-05-13 DIAGNOSIS — F41.1 GAD (GENERALIZED ANXIETY DISORDER): ICD-10-CM

## 2025-05-13 RX ORDER — CITALOPRAM 10 MG/1
20 TABLET ORAL DAILY
Qty: 60 TABLET | Refills: 2 | Status: SHIPPED | OUTPATIENT
Start: 2025-05-13 | End: 2025-08-11

## 2025-05-13 NOTE — PROGRESS NOTES
"PHYSICAL THERAPY EVALUATION AND TREATMENT    Patient Name: Ashley Berardinelli  MRN: [id number]  Today's Date: 5/14/2025                              Insurance:  Visit number: 1 (updated 05/13/25)  Insurance Type: Payor:  EMPLOYEE MEDICAL PLAN / Plan:  EMPLOYEE MEDICAL PLAN TRADITIONAL  / Product Type: *No Product type* / $25 COPAY 30 PT/OT V PCY 0 USED NEEDS AUTH CONTIGO PAYS % OOP 1600.00 689.99 APPLIED   Authorization or Plan of Care date Range: TBD  Copay: $25  Surgery: No surgery found, No surgery found.  Days since surgery: No surgery found   Referred by: Cayla Ward DPM     Assessment:        Ashley Berardinelli  is a 34 y.o. old patient who participated in a physical therapy evaluation today due to ***. Gena presents with signs and symptoms consistent with ***. Gena's impairments include: {talimpair:14644}.   Due to these impairments, she has the following functional limitations and participation restrictions: {talfl:11367}. Gena's clinical presentation is {talClinicalPresentation:63773::\"Stable and/or uncomplicated characteristics\"} with the level of complexity being {talcomplexity:63653::\"low\"}. Gena's potential for rehab is {talprognosis:08722::\"good\"}.  Skilled physical therapy services are appropriate and beneficial in order to achieve measurable and meaningful change in the objective tests and measures. Utilization of skilled physical therapy services will aid in advancing her functional status and attaining her therapy-related goals. Gena verbalized understanding and is in agreement with all goals and plan of care.  Gena left session with all questions answered and no increase in symptoms.      Plan:       NV: ***    Therapy Diagnosis:   Problem List Items Addressed This Visit           ICD-10-CM    Right foot pain - Primary M79.671    Tenosynovitis of right foot M65.971       Subjective    Ashley Berardinelli  is a 34 y.o. female  presenting to the clinic with chief " "complaint of     Onset: ***    SEMAJ: ***    Pain location:  ***  Pain description:  ***   ***/10 at worst; ***/10 at best    Worse with:  ***  Better with:  ***    Denies N/T   Denies radiating pain   Denies bowel/bladder incontinence    Prior treatments/interventions:  ***    Home: ***  PLOF: ***  CLOF: ***  Functional limitations: ***  Pt's goal: \" ***\"     Work: ***  Exercise: ***  Hobbies: ***    Diagnostic images:   4/2/25 X-RAY of R foot - No acute abnormality in the right foot   4/4/25 MRI of R foot - Small region of serpiginous subchondral signal abnormality of the 2nd metatarsal head favored to represent a focus of osteonecrosis. Freiberg's infraction also a consideration.    Medical History Form: Reviewed (scanned into chart)      Precautions:  Fall Risk: {Fall Risk:75291::\"None\"}    + ***  Denies: CA, pacemaker, DM, HTN, blood thinner medication use, latex allergy, epilepsy/seizures, or other known cardio/neuro/pulmonary problems   Denies unexpected weight loss/gain, night sweats, or night pain.    Previous surgeries include:  ***    Objective   Outcome Measures:  LEFS: ***/80    Posture: {POSTURE 1 PMR:74746}  Transfers: {mclassist:77489::\"IND\"}  Bed Mobility: {mclassist:15623::\"IND\"}  Gait: Pt ambulates {mclassist:69737::\"IND\"} with {mcldevice:91383::\"no assistive device\"}. Demos {mclgait:14395}. ***-reciprocal gait pattern observed.  Stairs: Pt ascends/descends 4 x 6\" steps with {0-2:98895} Hrs {mclassist:57597::\"IND\"}. ***-reciprocal step pattern observed.    Dermatomes/Sensation BLE:  Mid-Anterior Thigh (L2) L: {INTACT/IMPAIRED:08960}, R: {INTACT/IMPAIRED:91364}  Medial Knee (L3) L: {INTACT/IMPAIRED:67836}, R: {INTACT/IMPAIRED:21233}  Medial Malleolus (L4) L: {INTACT/IMPAIRED:21233}, R: {INTACT/IMPAIRED:21233}  Dorsal Second Toe Web Space (L5) L: {INTACT/IMPAIRED:21233}, R: {INTACT/IMPAIRED:21233}  Lateral Malleolus (S1) L: {INTACT/IMPAIRED:21233}, R: {INTACT/IMPAIRED:21233}  Popliteal Fossa (S2) L: " "{INTACT/IMPAIRED:21233}, R: {INTACT/IMPAIRED:21233}     Myotomes/Strength (MMT in sitting):  Hip Flex (L2) L: {MMT 0-5:83805::\"5\"} , R: {MMT 0-5:36612::\"5\"}  Hip Add L: {MMT 0-5:63327::\"5\"} , R: {MMT 0-5:30470::\"5\"}  Hip Abd L: {MMT 0-5:43889::\"5\"} , R: {MMT 0-5:74424::\"5\"}  Knee Ext (L3) L: {MMT 0-5:72338::\"5\"} , R: {MMT 0-5:27122::\"5\"}  Knee Flex L: {MMT 0-5:59846::\"5\"} , R: {MMT 0-5:43846::\"5\"}  Great Toe Extension (L5) L: {MMT 0-5:39896::\"5\"} , R: {MMT 0-5:37253::\"5\"}  Ankle DF L: {MMT 0-5:08497::\"5\"} , R: {MMT 0-5:24874::\"5\"}  Ankle PF (S1) L: {MMT 0-5:17243::\"5\"} , R: {MMT 0-5:37263::\"5\"}  Ankle Inversion: L: {MMT 0-5:97353::\"5\"} , R: {MMT 0-5:31583::\"5\"}  Ankle Eversion: L: {MMT 0-5:13993::\"5\"} , R: {MMT 0-5:96365::\"5\"}    Range of Motion:  Ankle DF R/L: ***  Ankle PF R/L: ***  Ankle IV R/L: ***  Ankle EV R/L: ***    Special Tests:  Single-Leg Heel Raise: L {POSITIVE/NEGATIVE:32082}; R {POSITIVE/NEGATIVE:22800}  Hop Test: L {POSITIVE/NEGATIVE:85576}; R {POSITIVE/NEGATIVE:69476}  Mar Test: L {POSITIVE/NEGATIVE:82667}; R {POSITIVE/NEGATIVE:90444}  Windlass Test: L {POSITIVE/NEGATIVE:38182}; R {POSITIVE/NEGATIVE:69291}  Anterior Drawer Test: L {POSITIVE/NEGATIVE:63622}; R {POSITIVE/NEGATIVE:26642}  Posterior Drawer Test: L {POSITIVE/NEGATIVE:38900}; R {POSITIVE/NEGATIVE:40865}  Varus Stress Test: L {POSITIVE/NEGATIVE:09130}; R {POSITIVE/NEGATIVE:53414}  Valgus Stress Test: L {POSITIVE/NEGATIVE:91550}; R {POSITIVE/NEGATIVE:37421}  Fibular Translation Test: L {POSITIVE/NEGATIVE:97644}; R {POSITIVE/NEGATIVE:49122}  Forced Dorsiflexion Test: L {POSITIVE/NEGATIVE:16134}; R {POSITIVE/NEGATIVE:36225}    Skin condition: ***    Edema: ***    Palpation: ***    Balance:   SLS (seconds) R/L: ***      Treatments:    Assigned HEP- [facility] ***    {talbillingoptions:67273}    EDUCATION:       -Educated Gena  on the role of PT and PT POC  -Educated Gena regarding benefit and purpose of skilled PT services along with " results of examination findings and how this correlates to their chief complaint.   -Answered Gena's questions in full.  -Educated Gena on relevant anatomy and the rationale for exercises  -Ashley Berardinelli advised to hold any ex if it increases pain, Ashley Berardinelli verbalized understanding    Goals:        STG's (within 2 weeks)  Ashley Berardinelli will decrease pain by >/= 2/10 points from baseline to improve ability to perform household/recreational activities.    Ashley Berardinelli will be able to sleep through the night with less than 2 interruptions due to pain in order to improve mental and physical well-being in order to safely participate in ADLs.     Ashley Berardinelli will demonstrate independence,  excellent understanding of and report compliance with at least 3 exercises in her HEP to facilitate the learning process to maximize PT benefits and to facilitate independent rehab program upon discharge.    LTG's (by discharge)    Patient will improve *** ankle inversion & eversion strength to >/=4+/5 for improved ankle and foot stability with ambulation.    Patient will improve *** ankle plantarflexion and dorsiflexion strength to >/=4+/5 for improved propulsion with gait.    Patient will report 0/10 pain with ambulation.    Patient will improve 1st MT extension strength to >/=4+/5 for improved push-off gait mechanics with ambulation.    Patient will improve LEFS score to by 9 points (MDC) to demonstrate significant improvement in the ability to complete household and community functional tasks with the lower extremity independently.    Ashley Berardinelli will decrease pain to 0-2/10 to improve ability to perform household/recreational activities.    Ashley Berardinelli will be able to sleep through the night without waking due to pain in order to improve mental and physical well-being in order to safely participate in ADLs.     Ashley Berardinelli will demonstrate independence,  excellent  understanding of and report compliance with HEP to facilitate the learning process to maximize PT benefits and to facilitate independent rehab program upon discharge.

## 2025-05-14 ENCOUNTER — APPOINTMENT (OUTPATIENT)
Dept: PHYSICAL THERAPY | Facility: CLINIC | Age: 35
End: 2025-05-14
Payer: COMMERCIAL

## 2025-05-28 ENCOUNTER — APPOINTMENT (OUTPATIENT)
Dept: ENDOCRINOLOGY | Facility: CLINIC | Age: 35
End: 2025-05-28
Payer: COMMERCIAL

## 2025-05-28 NOTE — PROGRESS NOTES
"Subjective   Patient ID: Ashley Berardinelli is a 34 y.o. female. They present today with a chief complaint of Cough (Cough/ chest congestion started Friday), Nasal Congestion, Generalized Body Aches, and Fever.    History of Present Illness    Cough  Associated symptoms include a fever.   Fever   Associated symptoms include coughing.       Past Medical History  Allergies as of 02/02/2025 - Reviewed 02/02/2025   Allergen Reaction Noted    Augmentin [amoxicillin-pot clavulanate] Wheezing 10/31/2024    Sucralfate Hives, Rash, and Unknown 04/28/2023       (Not in a hospital admission)       Past Medical History:   Diagnosis Date    Difficulty in walking, not elsewhere classified 11/16/2020    Difficulty walking    Lumbago with sciatica, unspecified side 11/16/2020    Lumbago with sciatica    Obesity, unspecified 12/15/2013    Obesity    Personal history of other diseases of the nervous system and sense organs     History of chronic fatigue syndrome    Personal history of other infectious and parasitic diseases 07/05/2017    History of herpes zoster    Personal history of other specified conditions 07/21/2020    History of syncope       Past Surgical History:   Procedure Laterality Date    MANDIBLE SURGERY  10/12/2015    Jaw Surgery    TONSILLECTOMY  10/12/2015    Tonsillectomy With Adenoidectomy        reports that she has been smoking cigarettes. She has never used smokeless tobacco. She reports current alcohol use. She reports that she does not use drugs.    Review of Systems  Review of Systems   Constitutional:  Positive for fever.   Respiratory:  Positive for cough.                                   Objective    Vitals:    02/02/25 1401   BP: 125/88   BP Location: Left arm   Patient Position: Sitting   BP Cuff Size: Adult   Pulse: 71   Resp: 16   Temp: 36.9 °C (98.4 °F)   TempSrc: Oral   SpO2: 97%   Weight: 104 kg (230 lb)   Height: 1.6 m (5' 3\")     No LMP recorded.    Physical Exam  Constitutional:       " Appearance: Normal appearance.   HENT:      Head: Normocephalic and atraumatic.      Right Ear: Tympanic membrane, ear canal and external ear normal.      Left Ear: Tympanic membrane, ear canal and external ear normal.      Nose: Mucosal edema (and erythema) present. No rhinorrhea.      Right Sinus: No maxillary sinus tenderness or frontal sinus tenderness.      Left Sinus: No maxillary sinus tenderness or frontal sinus tenderness.      Mouth/Throat:      Lips: Pink.      Mouth: Mucous membranes are moist. No oral lesions.      Dentition: Normal dentition. No gingival swelling.      Tongue: No lesions. Tongue does not deviate from midline.      Palate: No mass and lesions.      Pharynx: Postnasal drip present. No pharyngeal swelling, posterior oropharyngeal erythema or uvula swelling.   Cardiovascular:      Rate and Rhythm: Normal rate and regular rhythm.      Heart sounds: No murmur heard.  Pulmonary:      Effort: Pulmonary effort is normal. No respiratory distress.      Breath sounds: Normal breath sounds. No stridor. No wheezing, rhonchi or rales.   Lymphadenopathy:      Cervical: Cervical adenopathy present.   Neurological:      Mental Status: She is alert.         Procedures    Point of Care Test & Imaging Results from this visit  Results for orders placed or performed in visit on 02/02/25   POCT Covid-19 Rapid Antigen   Result Value Ref Range    POC JUDD-COV-2 AG  Presumptive negative test for SARS-CoV-2 (no antigen detected)     Presumptive negative test for SARS-CoV-2 (no antigen detected)   POCT Influenza A/B manually resulted   Result Value Ref Range    POC Rapid Influenza A Positive (A) Negative      No results found.    Diagnostic study results (if any) were reviewed by Vashti Young PA-C.    Assessment/Plan   Allergies, medications, history, and pertinent labs/EKGs/Imaging reviewed by Vashti Young PA-C.     Medical Decision Making  ***    Orders and Diagnoses  Diagnoses and all orders for  this visit:  Cough, unspecified type  -     POCT Covid-19 Rapid Antigen  -     POCT Influenza A/B manually resulted      Medical Admin Record      Patient disposition: { Disposition:09453}    Electronically signed by Vashti Young PA-C  2:34 PM       No

## 2025-06-09 NOTE — PROGRESS NOTES
No chief complaint on file.    History of Present Complaint:  The patient was referred to us by Referring Provider: ***. this is 35 y.o.  female  with a past history of {kt past medical history:62376} presenting with diagnosed of having RSD , right foot pain    Pain started *** due to {pain onset:44947}  Pain is {Better or worse:32983} {DESC; BETTER/WORSE:33705}   Patient history significant for the following red flags: {Red flags:82746}  The pain is described as {Pain description:97491} and is relieved by {pain relief:15090}      Prior Pain Therapies: {Prior pain therapy:28020}  Past surgical history:  {Past surgical history:14783}    Past medical history: {kt past medical history:03129}      Employment/disability/litigation: {ktlitigation:12050}  Social history: {KT social history:52557}    Diagnostic studies: MRI studies    Opioid Risk Assessment Score ***/26    Terrence Each Box that Applies Female Male   FAMILY HISTORY OF SUBSTANCE ABUSE  Terrence the boxes that applies   Alcohol ?  1    ? 3   Illegal drugs ?  2 ? 3   Rx drugs ?  4 ? 4   PERSONAL HISTORY OF SUBSTNACE ABUSE   Alcohol ?  3 ?  3   Illegal drugs ?  4 ?  4    Rx drugs ?  5 ?  5   Age Between 16-45 years ?  1 ?  1   History of Preadolescent Sexual Abuse ?  3 ?  0   PSYCHOLOGIC DISEASE   ADD, OCD, bipolar, schizophrenia  Opioid Risk Assessment  ?  2 ?  2   Depression ?  1 ?  1   Scoring Totals       Scoring (Risk)  0-3 - Low  4-7 - Moderate  8 - High  Score ***/26

## 2025-06-09 NOTE — PROGRESS NOTES
SUBJECTIVE:  This is a patient of Dr. Ferreira [facility] [address] [facility] [place] with 35 y.o.  female accompanied with her boyfriend with PMH of smoker, anxiety/depression Zoloft 150 mg, hydroxyzine 25 mg x 3,  panic attack propranolol 10 mg, suicidal attempt morbid obesity BMI 40, GERD, multiple musculoskeletal pain (possible fibromyalgia), possible left foot CRPS after a fall on 6/21/2024 who is here for follow-up coccygeal pain after coccygeal fracture that is getting worse now in addition to right foot pain that on the top of her ankle that was diagnosed with CRPS and finally she is having more numbness in her right hand.  I examined the right hand and the patient has carpal tunnel syndrome with positive Phalen maneuver and Tinel test.  I recommended uses wrist splint.  Her foot exam I really did not appreciate much of CRPS symptoms there is no hyperalgesia or allodynia, vasomotor or sudomotor changes there is no trophic changes as well and normal hair growth above.  The patient may have some chronic pain but I do not know what it is at this time.  And because of her general complaints of multiple musculoskeletal complaints I really believe that she has a component of fibromyalgia and we are going to start her neuro supplement in addition to opioid sparing infusion and we can do a referral to [facility] in addition to acupuncture.    Prior office visit:  4/25/2025 Dr. Fererira note: Ashley Berardinelli is a 34 y.o. female here for evaluation of right lateral and top of her foot pain, she has been experiencing the symptoms the last couple of weeks.  She denies any significant trauma or inciting event.  The pain is worse with weightbearing, walking improved with sitting and icing.  She was seen by orthopedics, podiatry and had an MRI done of her foot she was diagnosed of having RSD.  She will be ordered an ultrasound of her lower extremity rule out any  peripheral vascular disease which is negative.  She has started 300 mg of gabapentin at night.  I recommended activity modification keeping her leg elevated and starting physical therapy.  She may increase the dose of gabapentin to 300 mg twice daily.  If she does not respond would get an MRI of her lumbar spine to rule out any nerve root impingement     4/9/2025 Dr. Brendan Coronel note:  Plan: Discussed nonoperative and operative options in detail.   Risk and benefits discussed in detail. All questions answered today.  Recovery timeline and expectations discussed in detail.  Her pain is really out of proportion of the objective findings on exam and x-ray/MRI imaging.  I believe someone probably canceled her prednisone over concern of long-term prednisone risk of AVN.  I think this osteonecrosis is old and really does not appear to be active.  Her pain is really more diffuse and do not have a good handle on why she is so painful.  We discussed possible CRPS.  Recommend that she take her Medrol Dosepak that has been prescribed.  Will place her in a walking boot for support.  Will reevaluate next week.  Consider pain management referral.  New gabapentin.    Procedures:  None      Portions of record reviewed for pertinent issues: active problem list, medication list, allergies, family history, social history, notes from last encounter, encounters, lab results, imaging and other available records.      I have personally reviewed the OARRS report for this patient. This report is scanned into the electronic medical record. I have considered the risks of abuse, dependence, addiction and diversion. It showed: Gabapentin 100 mg recently and few Percocet and tramadol last year  OPIOID RISK ASSESSMENT SCORE 3/26  Aberrant behavior: none  My patient has no underlying substance abuse or alcohol abuse and there's no mental health conditions contributing to the patient's pain.      Review of Systems   HENT: Negative.     Eyes:  Negative.    Respiratory: Negative.     Cardiovascular: Negative.    Gastrointestinal: Negative.    Endocrine: Negative.    Genitourinary: Negative.    Musculoskeletal:  Positive for arthralgias and myalgias.   Skin: Negative.    Neurological:  Positive for numbness.   Hematological: Negative.    Psychiatric/Behavioral: Negative.          Physical Exam  Vitals and nursing note reviewed.   Constitutional:       Appearance: Normal appearance.       HENT:      Head: Normocephalic and atraumatic.      Nose: Nose normal.   Eyes:      Extraocular Movements: Extraocular movements intact.      Conjunctiva/sclera: Conjunctivae normal.      Pupils: Pupils are equal, round, and reactive to light.   Cardiovascular:      Rate and Rhythm: Normal rate and regular rhythm.      Pulses: Normal pulses.      Heart sounds: Normal heart sounds.   Pulmonary:      Effort: Pulmonary effort is normal.      Breath sounds: Normal breath sounds.   Abdominal:      General: Abdomen is flat. Bowel sounds are normal.      Palpations: Abdomen is soft.   Skin:     General: Skin is warm.   Neurological:      General: No focal deficit present.      Mental Status: She is alert and oriented to person, place, and time.   Psychiatric:         Mood and Affect: Mood normal.         Behavior: Behavior normal.                      Plan  At least 50% of the visit was involved in the discussion of the options for treatment. We discussed exercises, medication, interventional therapies and surgery. Healthy life style is essential with patient hard work to achieve the wellness. In addition; discussion with the patient and/or family about any of the diagnostic results, impressions and/or recommended diagnostic studies, prognosis, risks and benefits of treatment options, instructions for treatment and/or follow-up, importance of compliance with chosen treatment options, risk-factor reduction, and patient/family education.         Continue self-directed physical  therapy at least daily exercises for minimum of 20-minute  Domo  Neuro supplement ordered  Opioid sparing infusion once every 2 weeks  Cock splint ordered for the patient but never picked up she can pick it up in the office  Healthy lifestyle and anti-inflammatory diet in addition to weight control discussed with the patient  Alternative chronic pain therapies was discussed, encouraged and information was handed  Return to Clinic 3 months     *Please note this report has been produced using speech recognition software and may contain errors related to that system including grammar, punctuation and spelling as well as words and phrases that may be inappropriate. If there are questions or concerns, please feel free to contact me to clarify.    Christi Hodge MD     Patient was identified as a fall risk. Risk prevention instructions provided.

## 2025-06-09 NOTE — PROGRESS NOTES
SUBJECTIVE:  This is 35 y.o.  female with PMH of {kt past medical history:45177} who is here for follow-up ***      Prior office visit:  4/25/2025 Dr. Ferreira note: Ashley Berardinelli is a 34 y.o. female here for evaluation of right lateral and top of her foot pain, she has been experiencing the symptoms the last couple of weeks.  She denies any significant trauma or inciting event.  The pain is worse with weightbearing, walking improved with sitting and icing.  She was seen by orthopedics, podiatry and had an MRI done of her foot she was diagnosed of having RSD.  She will be ordered an ultrasound of her lower extremity rule out any peripheral vascular disease which is negative.  She has started 300 mg of gabapentin at night.  I recommended activity modification keeping her leg elevated and starting physical therapy.  She may increase the dose of gabapentin to 300 mg twice daily.  If she does not respond would get an MRI of her lumbar spine to rule out any nerve root impingement.           Procedures:  *** the patient has had a ***% improvement in pain and function      Portions of record reviewed for pertinent issues: active problem list, medication list, allergies, family history, social history, notes from last encounter, encounters, lab results, imaging and other available records.      I have personally reviewed the OARRS report for this patient. This report is scanned into the electronic medical record. I have considered the risks of abuse, dependence, addiction and diversion. It showed: Few gabapentin 100 mg this year and few tramadol and oxycodone last year  OPIOID RISK ASSESSMENT SCORE ***/26  Controlled substance agreement: ***  Activities of daily living: ***  Adverse effects: ***  Analgesia: W/O: ***/10, W ***/10    Toxicology screen: ***  Aberrant behavior: ***  My patient has no underlying substance abuse or alcohol abuse and there's no mental health conditions contributing to the patient's  pain.    Diagnostic studies:  4/24/2024 vascular studies right lower extremity within normal limits  4/7/2025 MRI right foot essentially normal possible mild osteoarthritis in the second metatarsal head  6/29/2022 MRI lumbar spine showed no bone marrow edema or endplate changes degenerative changes at L3-4 with central disc bulging and dehydration:    L3-4:          Review of Systems     Physical Exam                 Plan  At least 50% of the visit was involved in the discussion of the options for treatment. We discussed exercises, medication, interventional therapies and surgery. Healthy life style is essential with patient hard work to achieve the wellness. In addition; discussion with the patient and/or family about any of the diagnostic results, impressions and/or recommended diagnostic studies, prognosis, risks and benefits of treatment options, instructions for treatment and/or follow-up, importance of compliance with chosen treatment options, risk-factor reduction, and patient/family education.         Continue self-directed physical therapy at least daily exercises for minimum of 20-minute  *** Smoking cessation  Healthy lifestyle and anti-inflammatory diet in addition to weight control discussed with the patient  Alternative chronic pain therapies was discussed, encouraged and information was handed  Return to Clinic ***     *Please note this report has been produced using speech recognition software and may contain errors related to that system including grammar, punctuation and spelling as well as words and phrases that may be inappropriate. If there are questions or concerns, please feel free to contact me to clarify.    Christi Hodge MD

## 2025-06-10 ENCOUNTER — APPOINTMENT (OUTPATIENT)
Dept: PAIN MEDICINE | Facility: CLINIC | Age: 35
End: 2025-06-10
Payer: COMMERCIAL

## 2025-06-11 ENCOUNTER — APPOINTMENT (OUTPATIENT)
Dept: PHYSICAL THERAPY | Facility: CLINIC | Age: 35
End: 2025-06-11
Payer: COMMERCIAL

## 2025-06-11 ENCOUNTER — DOCUMENTATION (OUTPATIENT)
Dept: PHYSICAL THERAPY | Facility: CLINIC | Age: 35
End: 2025-06-11
Payer: COMMERCIAL

## 2025-06-11 DIAGNOSIS — M65.971 TENOSYNOVITIS OF RIGHT FOOT: ICD-10-CM

## 2025-06-11 DIAGNOSIS — M79.671 RIGHT FOOT PAIN: Primary | ICD-10-CM

## 2025-06-11 NOTE — PROGRESS NOTES
Physical Therapy                 Therapy Communication Note    Patient Name: Ashley Berardinelli  MRN: [id number]  Department:   Room: Room/bed info not found  Today's Date: 6/11/2025     Discipline: Physical Therapy    Missed Time: Cancel    Comment: Pt canceled initial evaluation via MyChart with illness stated as reason.

## 2025-06-12 ENCOUNTER — OFFICE VISIT (OUTPATIENT)
Dept: PAIN MEDICINE | Facility: CLINIC | Age: 35
End: 2025-06-12
Payer: COMMERCIAL

## 2025-06-12 VITALS
SYSTOLIC BLOOD PRESSURE: 135 MMHG | BODY MASS INDEX: 38.41 KG/M2 | DIASTOLIC BLOOD PRESSURE: 91 MMHG | TEMPERATURE: 97.9 F | HEIGHT: 64 IN | OXYGEN SATURATION: 98 % | RESPIRATION RATE: 16 BRPM | WEIGHT: 225 LBS | HEART RATE: 81 BPM

## 2025-06-12 DIAGNOSIS — G56.01 CARPAL TUNNEL SYNDROME OF RIGHT WRIST: ICD-10-CM

## 2025-06-12 DIAGNOSIS — G90.521 COMPLEX REGIONAL PAIN SYNDROME TYPE 1 OF RIGHT LOWER EXTREMITY: Primary | ICD-10-CM

## 2025-06-12 DIAGNOSIS — S32.2XXS CLOSED FRACTURE OF COCCYX, SEQUELA: ICD-10-CM

## 2025-06-12 DIAGNOSIS — J45.20 MILD INTERMITTENT ASTHMA, UNCOMPLICATED (HHS-HCC): ICD-10-CM

## 2025-06-12 DIAGNOSIS — M79.7 FIBROMYALGIA: ICD-10-CM

## 2025-06-12 PROCEDURE — 99214 OFFICE O/P EST MOD 30 MIN: CPT | Performed by: ANESTHESIOLOGY

## 2025-06-12 PROCEDURE — 3008F BODY MASS INDEX DOCD: CPT | Performed by: ANESTHESIOLOGY

## 2025-06-12 RX ORDER — DIPHENHYDRAMINE HYDROCHLORIDE 50 MG/ML
50 INJECTION, SOLUTION INTRAMUSCULAR; INTRAVENOUS AS NEEDED
OUTPATIENT
Start: 2025-06-12

## 2025-06-12 RX ORDER — ACETAMINOPHEN 160 MG/5ML
200 SUSPENSION, ORAL (FINAL DOSE FORM) ORAL 3 TIMES DAILY
Qty: 90 CAPSULE | Refills: 11 | Status: SHIPPED | OUTPATIENT
Start: 2025-06-12 | End: 2026-06-12

## 2025-06-12 RX ORDER — VITAMIN B COMPLEX
1 CAPSULE ORAL 2 TIMES DAILY
Qty: 60 CAPSULE | Refills: 11 | Status: SHIPPED | OUTPATIENT
Start: 2025-06-12 | End: 2026-06-12

## 2025-06-12 RX ORDER — KETOROLAC TROMETHAMINE 30 MG/ML
30 INJECTION, SOLUTION INTRAMUSCULAR; INTRAVENOUS ONCE
OUTPATIENT
Start: 2025-06-12 | End: 2025-06-12

## 2025-06-12 RX ORDER — ASCORBIC ACID 500 MG
500 TABLET ORAL DAILY
Qty: 90 TABLET | Refills: 11 | Status: SHIPPED | OUTPATIENT
Start: 2025-06-12

## 2025-06-12 RX ORDER — EPINEPHRINE 1 MG/ML
0.3 INJECTION, SOLUTION, CONCENTRATE INTRAVENOUS EVERY 5 MIN PRN
OUTPATIENT
Start: 2025-06-12

## 2025-06-12 RX ORDER — ALBUTEROL SULFATE 0.83 MG/ML
3 SOLUTION RESPIRATORY (INHALATION) AS NEEDED
OUTPATIENT
Start: 2025-06-12

## 2025-06-12 RX ORDER — IBUPROFEN 800 MG/1
800 TABLET, FILM COATED ORAL EVERY 8 HOURS PRN
COMMUNITY

## 2025-06-12 RX ORDER — FAMOTIDINE 10 MG/ML
20 INJECTION, SOLUTION INTRAVENOUS ONCE AS NEEDED
OUTPATIENT
Start: 2025-06-12

## 2025-06-12 RX ORDER — PERPHENAZINE 16 MG
600 TABLET ORAL
Qty: 90 CAPSULE | Refills: 11 | Status: SHIPPED | OUTPATIENT
Start: 2025-06-12

## 2025-06-12 SDOH — ECONOMIC STABILITY: FOOD INSECURITY: WITHIN THE PAST 12 MONTHS, YOU WORRIED THAT YOUR FOOD WOULD RUN OUT BEFORE YOU GOT MONEY TO BUY MORE.: NEVER TRUE

## 2025-06-12 SDOH — ECONOMIC STABILITY: FOOD INSECURITY: WITHIN THE PAST 12 MONTHS, THE FOOD YOU BOUGHT JUST DIDN'T LAST AND YOU DIDN'T HAVE MONEY TO GET MORE.: NEVER TRUE

## 2025-06-12 ASSESSMENT — ENCOUNTER SYMPTOMS
PSYCHIATRIC NEGATIVE: 1
ENDOCRINE NEGATIVE: 1
ARTHRALGIAS: 1
CARDIOVASCULAR NEGATIVE: 1
GASTROINTESTINAL NEGATIVE: 1
HEMATOLOGIC/LYMPHATIC NEGATIVE: 1
RESPIRATORY NEGATIVE: 1
LOSS OF SENSATION IN FEET: 0
EYES NEGATIVE: 1
NUMBNESS: 1
OCCASIONAL FEELINGS OF UNSTEADINESS: 1
MYALGIAS: 1
DEPRESSION: 0

## 2025-06-12 ASSESSMENT — LIFESTYLE VARIABLES
AUDIT TOTAL SCORE: 1
HOW OFTEN DO YOU HAVE A DRINK CONTAINING ALCOHOL: MONTHLY OR LESS
HAS A RELATIVE, FRIEND, DOCTOR, OR ANOTHER HEALTH PROFESSIONAL EXPRESSED CONCERN ABOUT YOUR DRINKING OR SUGGESTED YOU CUT DOWN: NO
HOW OFTEN DURING THE LAST YEAR HAVE YOU FAILED TO DO WHAT WAS NORMALLY EXPECTED FROM YOU BECAUSE OF DRINKING: NEVER
AUDIT-C TOTAL SCORE: 1
SKIP TO QUESTIONS 9-10: 1
HOW OFTEN DURING THE LAST YEAR HAVE YOU BEEN UNABLE TO REMEMBER WHAT HAPPENED THE NIGHT BEFORE BECAUSE YOU HAD BEEN DRINKING: NEVER
HOW OFTEN DURING THE LAST YEAR HAVE YOU NEEDED AN ALCOHOLIC DRINK FIRST THING IN THE MORNING TO GET YOURSELF GOING AFTER A NIGHT OF HEAVY DRINKING: NEVER
HOW OFTEN DO YOU HAVE SIX OR MORE DRINKS ON ONE OCCASION: NEVER
HAVE YOU OR SOMEONE ELSE BEEN INJURED AS A RESULT OF YOUR DRINKING: NO
HOW OFTEN DURING THE LAST YEAR HAVE YOU FOUND THAT YOU WERE NOT ABLE TO STOP DRINKING ONCE YOU HAD STARTED: NEVER
HOW OFTEN DURING THE LAST YEAR HAVE YOU HAD A FEELING OF GUILT OR REMORSE AFTER DRINKING: NEVER
HOW MANY STANDARD DRINKS CONTAINING ALCOHOL DO YOU HAVE ON A TYPICAL DAY: 1 OR 2

## 2025-06-12 ASSESSMENT — PAIN - FUNCTIONAL ASSESSMENT: PAIN_FUNCTIONAL_ASSESSMENT: 0-10

## 2025-06-12 ASSESSMENT — PAIN SCALES - GENERAL
PAINLEVEL_OUTOF10: 4
PAINLEVEL_OUTOF10: 4

## 2025-06-12 NOTE — PROGRESS NOTES
Chief Complaint   Patient presents with    New Patient Visit     History of Present Complaint:  The patient was referred to us by Referring Provider: Self-referral. this is 35 y.o.  female  with a past history of  smoker, anxiety/depression Zoloft 150 mg, hydroxyzine 25 mg x 3,  panic attack propranolol 10 mg, suicidal attempt morbid obesity BMI 40, GERD, multiple musculoskeletal pain (possible fibromyalgia),  presenting with CRPS of the right foot, numbness and tingling in her right hand, back pain    Back pain started 2005, right foot started  4/2025 hand numbness and tingling started 2 weeks  Pain is better with apply ice on the right foot, nothing makes her hand better,  better with muscle relaxers Biofreeze heat and rest sometimes  The foot pain is described as sharp and throbbing and aching patient states that this pain that she is describing enables her to apply weight to that foot, back pain is aching and and throbbing     Prior Pain Therapies: Prior pain physician  , physical Therapy  , and aquatic therapyafter fracturing her sacrum 2/2022    Past surgical history: Tonsillectomy and adenoids removal, gallbladder removal, wisdom teeth    Employment/disability/litigation: Patient has a medical assistant, works for [employer].    Social history: single, Finished high school, and vocational training.      Diagnostic studies: MRI studies, x-rays        Terrence Each Box that Applies Female Male   FAMILY HISTORY OF SUBSTANCE ABUSE  Terrence the boxes that applies   Alcohol x  1    ? 3   Illegal drugs ?  2 ? 3   Rx drugs ?  4 ? 4   PERSONAL HISTORY OF SUBSTNACE ABUSE   Alcohol ?  3 ?  3   Illegal drugs ?  4 ?  4    Rx drugs ?  5 ?  5   Age Between 16-45 years x  1 ?  1   History of Preadolescent Sexual Abuse ?  3 ?  0   PSYCHOLOGIC DISEASE   ADD, OCD, bipolar, schizophrenia  Opioid Risk Assessment  ?  2 ?  2   Depression x  1 ?  1   Scoring Totals 3      Scoring (Risk)  0-3 - Low  4-7 - Moderate  8 - High  Opioid Risk Assessment  Score 3/26

## 2025-06-16 ENCOUNTER — APPOINTMENT (OUTPATIENT)
Dept: PAIN MEDICINE | Facility: CLINIC | Age: 35
End: 2025-06-16
Payer: COMMERCIAL

## 2025-06-23 PROBLEM — N89.8 VAGINAL DISCHARGE: Status: RESOLVED | Noted: 2023-04-28 | Resolved: 2025-06-23

## 2025-06-23 PROBLEM — S39.92XA TAILBONE INJURY: Status: RESOLVED | Noted: 2023-04-28 | Resolved: 2025-06-23

## 2025-06-23 PROBLEM — M54.9 BACK PAIN: Status: RESOLVED | Noted: 2023-04-28 | Resolved: 2025-06-23

## 2025-06-23 PROBLEM — J02.9 ACUTE PHARYNGITIS: Status: RESOLVED | Noted: 2023-04-28 | Resolved: 2025-06-23

## 2025-06-23 PROBLEM — M54.2 NECK PAIN: Status: RESOLVED | Noted: 2023-04-28 | Resolved: 2025-06-23

## 2025-06-23 NOTE — PROGRESS NOTES
Subjective   Patient ID:   [id number]   Ashley Berardinelli is a 35 y.o. female who presents for Rash and Itching (NPV possible medication allergies ).    Chief Complaint   Patient presents with    Rash    Itching     NPV possible medication allergies       This is a new patient, with H/O PCOS, oligomenorrhea, GERD, fibromyalgia, presenting for evaluation of random rashes and itching.    The past 3-4 months she has been having severe pruritus on her chest, abdomen and neck with intermittent raised welts.  She started Zoloft Feb 2025 and thought this was the culprit.  Her psychiatrist did not agree because the itching started a couple of weeks after.  She was switched to Celexa but itching persists.  She was sick a few months ago and has never had COVID.  She has no pets.  She switched to Dove soap and switched detergents with no improvement.  She has tried Benadryl and hydrocortisone cream with no relief.      She endorses a rash and raised blotching on her arms and her skin.  Her skin returns to normal when the rash is resolved.  She wakes up with welts because she scratches during the night.  She takes Pepcid but stopped it a week ago.    Patient was on gabapentin for neuropathy and fibromyalgia.  But she only took a couple of doses and doesn't know if it affected the itching.     Patient has intermittent palpations attributed to anxiety.  She has resided in her apartment for 4 years.  She denies diarrhea or other ill Sx.    Patient used to work at  but was just Dxd with complex regional pain syndrome and stopped working.    Review of Systems   Cardiovascular:  Positive for palpitations (intermittent due to anxiety).   Gastrointestinal:  Negative for diarrhea.   Skin:  Positive for rash (intermittent welts and).        Chronic itching, worse on her neck.   Psychiatric/Behavioral:  The patient is nervous/anxious.      Objective   Wt 116 kg (255 lb)   SpO2 96%   BMI 43.77 kg/m²      Physical  Exam  Constitutional:       Appearance: Normal appearance.   HENT:      Head: Normocephalic and atraumatic.      Right Ear: External ear normal. There is no impacted cerumen.      Left Ear: External ear normal. There is no impacted cerumen.      Nose: No congestion or rhinorrhea.   Eyes:      Extraocular Movements: Extraocular movements intact.      Conjunctiva/sclera: Conjunctivae normal.      Pupils: Pupils are equal, round, and reactive to light.   Cardiovascular:      Rate and Rhythm: Normal rate and regular rhythm.      Heart sounds: No murmur heard.     No friction rub. No gallop.   Pulmonary:      Effort: No respiratory distress.      Breath sounds: No wheezing, rhonchi or rales.   Skin:     General: Skin is warm and dry.   Neurological:      Mental Status: She is alert.   Psychiatric:         Mood and Affect: Mood normal.         Behavior: Behavior normal.     Allergy testing was performed on Ashley Berardinelli using standard technique. There were no immediate complications.    Test Results  Panel 1  1.   Histamine: 5 x 5  2.   Saline - Diluent: 0  3.   Cockroach: 0  4.   Cotton Linters: 0  5.   Cat: 0  6.   Do  7.   D. Farinae: 0  8.   D. Pter: 0  9.   Feather: 0  10. Alternaria: 0  Tree Panel  1.   Rakan: 0  2.   Beech: 0  3.   Birch: 0  4.   [place]: 0  5.   Silver Maple: 0  6.   Hickory: 0  7.   Maple: 0  8.   Oak: 0  9.   [place]: 0  10. [place]: 0  Grass/Misc Tree  1.   [place]: 0  2.   [place] Blue: 0  3.   Bakari: 0  4.   Sandoval: 0  5.   Orchard: 0  6.   Red Top: 0  7.   Rye Grass: 0  8.   Sweet Vernal: 0  9.   Black [place]: 0  10. [place]: 0  Weeds  1.   Cocklebur: 0  2.   Common Mugwort: 0  3.   English Plantain: 0  4.   Hemp: 0  5.   Goldenrod: 0  6.   Kochia: 0  7.   Lamb's Quarter: 0  8.   Lopes Elder: 0  9.   Pigweed: 0  10. Ragweed: 0  Molds  1.   Aspergillus Niger: 0  2.   Aureobasid: 0  3.   Bipolaris: 0  4.   Cladosporidium: 0  5.   Epicoccum: 0  6.   Fusarium: 0  7.   Geotrichum:  0  8.   Helminthosporium: 0  9.   Penicillium: 0  10. Phoma: 0    Skin testing is negative.    Assessment/Plan     Urticaria  She C/O chest, abdomen and neck itching accompanied by intermittent raised welts.  She thought Zoloft may have been a trigger because she started it Feb 2025, but Sx started a couple of weeks after.  She switched to Celexa but the itching persists.  She was sick a few months ago.  She switched to Dove soap and switched detergents with no improvement.  Benadryl and hydrocortisone cream did not help.  She also notes having a rash and raised blotching on her arms and her skin, which returns to normal skin once the rash is gone.      Skin testing is negative.    She will take Zyrtec 2 tablets 2 times a day and I would like her to give me an update after 5 days.    The differential diagnosis for chronic urticaria is vast and can include autoimmunity, allergy, malignancy, parasite infection, immunologic disorder, thyroid dysfunction, vitamin D deficiency, and hepatic or renal dysfunction. I am going to order a complete laboratory evaluation.  Once all of your labs are complete, the office will contact you. Your results will post in [facility] My chart as soon as they are complete.    By signing my name below, I, Sneha Hummel, attest that this documentation has been prepared under the direction and in the presence of Jennifer Mercedes MD.  All medical record entries made by the Elisabethibe were at my direction and personally dictated by me. I have reviewed the chart and agree that the record accurately reflects my personal performance of the history, physical exam, discussion and plan.

## 2025-06-25 ENCOUNTER — APPOINTMENT (OUTPATIENT)
Dept: ALLERGY | Facility: CLINIC | Age: 35
End: 2025-06-25
Payer: COMMERCIAL

## 2025-06-25 VITALS — WEIGHT: 255 LBS | BODY MASS INDEX: 43.77 KG/M2 | OXYGEN SATURATION: 96 %

## 2025-06-25 DIAGNOSIS — L50.9 URTICARIA: Primary | ICD-10-CM

## 2025-06-25 PROCEDURE — 95004 PERQ TESTS W/ALRGNC XTRCS: CPT | Performed by: ALLERGY & IMMUNOLOGY

## 2025-06-25 PROCEDURE — 99204 OFFICE O/P NEW MOD 45 MIN: CPT | Performed by: ALLERGY & IMMUNOLOGY

## 2025-06-25 RX ORDER — NITROFURANTOIN (MACROCRYSTALS) 100 MG/1
CAPSULE ORAL NIGHTLY
COMMUNITY
Start: 2025-06-20

## 2025-06-25 ASSESSMENT — ENCOUNTER SYMPTOMS
DIARRHEA: 0
PALPITATIONS: 1
NERVOUS/ANXIOUS: 1

## 2025-06-25 NOTE — PATIENT INSTRUCTIONS
Skin testing is negative.    Complete blood work to evaluate causes of hives.  I will follow up with you with results and further recommendations.    Take Zyrtec 2 tablets 2 times a day.  ([employer] or [employer] has brand by [employer]).    Let me know after 5 days if you are better, worse or the same.

## 2025-06-25 NOTE — LETTER
June 28, 2025     TIN Mccormick  [address] [place] [id number]    Patient: Ashley Berardinelli   YOB: 1990   Date of Visit: 6/25/2025       Dear TIN Blake:    Thank you for referring Ashley Berardinelli to me for evaluation. Below are my notes for this consultation.  If you have questions, please do not hesitate to call me. I look forward to following your patient along with you.       Sincerely,     Jennifer Mercedes MD      CC: No Recipients  ______________________________________________________________________________________      Subjective  Patient ID:   [id number]   Ashley Berardinelli is a 35 y.o. female who presents for Rash and Itching (NPV possible medication allergies ).    Chief Complaint   Patient presents with   • Rash   • Itching     NPV possible medication allergies       This is a new patient, with H/O PCOS, oligomenorrhea, GERD, fibromyalgia, presenting for evaluation of random rashes and itching.    The past 3-4 months she has been having severe pruritus on her chest, abdomen and neck with intermittent raised welts.  She started Zoloft Feb 2025 and thought this was the culprit.  Her psychiatrist did not agree because the itching started a couple of weeks after.  She was switched to Celexa but itching persists.  She was sick a few months ago and has never had COVID.  She has no pets.  She switched to Dove soap and switched detergents with no improvement.  She has tried Benadryl and hydrocortisone cream with no relief.      She endorses a rash and raised blotching on her arms and her skin.  Her skin returns to normal when the rash is resolved.  She wakes up with welts because she scratches during the night.  She takes Pepcid but stopped it a week ago.    Patient was on gabapentin for neuropathy and fibromyalgia.  But she only took a couple of doses and doesn't know if it affected the itching.     Patient has intermittent palpations attributed  to anxiety.  She has resided in her apartment for 4 years.  She denies diarrhea or other ill Sx.    Patient used to work at  but was just Dxd with complex regional pain syndrome and stopped working.    Review of Systems   Cardiovascular:  Positive for palpitations (intermittent due to anxiety).   Gastrointestinal:  Negative for diarrhea.   Skin:  Positive for rash (intermittent welts and).        Chronic itching, worse on her neck.   Psychiatric/Behavioral:  The patient is nervous/anxious.      Objective  Wt 116 kg (255 lb)   SpO2 96%   BMI 43.77 kg/m²      Physical Exam  Constitutional:       Appearance: Normal appearance.   HENT:      Head: Normocephalic and atraumatic.      Right Ear: External ear normal. There is no impacted cerumen.      Left Ear: External ear normal. There is no impacted cerumen.      Nose: No congestion or rhinorrhea.   Eyes:      Extraocular Movements: Extraocular movements intact.      Conjunctiva/sclera: Conjunctivae normal.      Pupils: Pupils are equal, round, and reactive to light.   Cardiovascular:      Rate and Rhythm: Normal rate and regular rhythm.      Heart sounds: No murmur heard.     No friction rub. No gallop.   Pulmonary:      Effort: No respiratory distress.      Breath sounds: No wheezing, rhonchi or rales.   Skin:     General: Skin is warm and dry.   Neurological:      Mental Status: She is alert.   Psychiatric:         Mood and Affect: Mood normal.         Behavior: Behavior normal.     Allergy testing was performed on Ashley Berardinelli using standard technique. There were no immediate complications.    Test Results  Panel 1  1.   Histamine: 5 x 5  2.   Saline - Diluent: 0  3.   Cockroach: 0  4.   Cotton Linters: 0  5.   Cat: 0  6.   Do  7.   D. Farinae: 0  8.   D. Pter: 0  9.   Feather: 0  10. Alternaria: 0  Tree Panel  1.   Rakan: 0  2.   Beech: 0  3.   Birch: 0  4.   [place]: 0  5.   Silver Maple: 0  6.   Hickory: 0  7.   Maple: 0  8.   Oak: 0  9.   Pine:  0  10. Sod: 0  Grass/Misc Tree  1.   [place]: 0  2.   [place] Blue: 0  3.   Bakari: 0  4.   Sandoval: 0  5.   Orchard: 0  6.   Red Top: 0  7.   Rye Grass: 0  8.   Sweet Vernal: 0  9.   Black [place]: 0  10. [place]: 0  Weeds  1.   Cocklebur: 0  2.   Common Mugwort: 0  3.   English Plantain: 0  4.   Hemp: 0  5.   Goldenrod: 0  6.   Kochia: 0  7.   Lamb's Quarter: 0  8.   Lopes Elder: 0  9.   Pigweed: 0  10. Ragweed: 0  Molds  1.   Aspergillus Niger: 0  2.   Aureobasid: 0  3.   Bipolaris: 0  4.   Cladosporidium: 0  5.   Epicoccum: 0  6.   Fusarium: 0  7.   Geotrichum: 0  8.   Helminthosporium: 0  9.   Penicillium: 0  10. Phoma: 0    Skin testing is negative.    Assessment/Plan    Urticaria  She C/O chest, abdomen and neck itching accompanied by intermittent raised welts.  She thought Zoloft may have been a trigger because she started it Feb 2025, but Sx started a couple of weeks after.  She switched to Celexa but the itching persists.  She was sick a few months ago.  She switched to Dove soap and switched detergents with no improvement.  Benadryl and hydrocortisone cream did not help.  She also notes having a rash and raised blotching on her arms and her skin, which returns to normal skin once the rash is gone.      Skin testing is negative.    She will take Zyrtec 2 tablets 2 times a day and I would like her to give me an update after 5 days.    The differential diagnosis for chronic urticaria is vast and can include autoimmunity, allergy, malignancy, parasite infection, immunologic disorder, thyroid dysfunction, vitamin D deficiency, and hepatic or renal dysfunction. I am going to order a complete laboratory evaluation.  Once all of your labs are complete, the office will contact you. Your results will post in  My chart as soon as they are complete.    By signing my name below, I, Sneha Hummel, attest that this documentation has been prepared under the direction and in the presence of Jennifer INIGUEZ  MD Daren.  All medical record entries made by the Scribe were at my direction and personally dictated by me. I have reviewed the chart and agree that the record accurately reflects my personal performance of the history, physical exam, discussion and plan.

## 2025-06-26 PROBLEM — L50.9 URTICARIA: Status: ACTIVE | Noted: 2025-06-26

## 2025-06-26 NOTE — ASSESSMENT & PLAN NOTE
She C/O chest, abdomen and neck itching accompanied by intermittent raised welts.  She thought Zoloft may have been a trigger because she started it Feb 2025, but Sx started a couple of weeks after.  She switched to Celexa but the itching persists.  She was sick a few months ago.  She switched to Dove soap and switched detergents with no improvement.  Benadryl and hydrocortisone cream did not help.  She also notes having a rash and raised blotching on her arms and her skin, which returns to normal skin once the rash is gone.      Skin testing is negative.    She will take Zyrtec 2 tablets 2 times a day and I would like her to give me an update after 5 days.    The differential diagnosis for chronic urticaria is vast and can include autoimmunity, allergy, malignancy, parasite infection, immunologic disorder, thyroid dysfunction, vitamin D deficiency, and hepatic or renal dysfunction. I am going to order a complete laboratory evaluation.

## 2025-06-28 LAB
25(OH)D3+25(OH)D2 SERPL-MCNC: 36 NG/ML (ref 30–100)
ALBUMIN SERPL-MCNC: 4.6 G/DL (ref 3.6–5.1)
ALP SERPL-CCNC: 107 U/L (ref 31–125)
ALT SERPL-CCNC: 31 U/L (ref 6–29)
ANA SER QL IF: NEGATIVE
ANION GAP SERPL CALCULATED.4IONS-SCNC: 10 MMOL/L (CALC) (ref 7–17)
AST SERPL-CCNC: 24 U/L (ref 10–30)
BASOPHILS # BLD AUTO: 118 CELLS/UL (ref 0–200)
BASOPHILS NFR BLD AUTO: 1.2 %
BILIRUB SERPL-MCNC: 0.4 MG/DL (ref 0.2–1.2)
BUN SERPL-MCNC: 12 MG/DL (ref 7–25)
CALCIUM SERPL-MCNC: 9.5 MG/DL (ref 8.6–10.2)
CHLORIDE SERPL-SCNC: 106 MMOL/L (ref 98–110)
CO2 SERPL-SCNC: 23 MMOL/L (ref 20–32)
CREAT SERPL-MCNC: 0.68 MG/DL (ref 0.5–0.97)
EGFRCR SERPLBLD CKD-EPI 2021: 115 ML/MIN/1.73M2
EOSINOPHIL # BLD AUTO: 235 CELLS/UL (ref 15–500)
EOSINOPHIL NFR BLD AUTO: 2.4 %
ERYTHROCYTE [DISTWIDTH] IN BLOOD BY AUTOMATED COUNT: 12.6 % (ref 11–15)
ERYTHROCYTE [SEDIMENTATION RATE] IN BLOOD BY WESTERGREN METHOD: 6 MM/H
FC EPSILON RI + RII AB SER-ACNC: NORMAL
GLUCOSE SERPL-MCNC: 104 MG/DL (ref 65–99)
HCT VFR BLD AUTO: 44.8 % (ref 35–45)
HGB BLD-MCNC: 15 G/DL (ref 11.7–15.5)
IGE SERPL-ACNC: 216 KU/L
LYMPHOCYTES # BLD AUTO: 2009 CELLS/UL (ref 850–3900)
LYMPHOCYTES NFR BLD AUTO: 20.5 %
MCH RBC QN AUTO: 30.2 PG (ref 27–33)
MCHC RBC AUTO-ENTMCNC: 33.5 G/DL (ref 32–36)
MCV RBC AUTO: 90.1 FL (ref 80–100)
MONOCYTES # BLD AUTO: 529 CELLS/UL (ref 200–950)
MONOCYTES NFR BLD AUTO: 5.4 %
NEUTROPHILS # BLD AUTO: 6909 CELLS/UL (ref 1500–7800)
NEUTROPHILS NFR BLD AUTO: 70.5 %
PLATELET # BLD AUTO: 307 THOUSAND/UL (ref 140–400)
PMV BLD REES-ECKER: 12 FL (ref 7.5–12.5)
POTASSIUM SERPL-SCNC: 4.2 MMOL/L (ref 3.5–5.3)
PROT SERPL-MCNC: 7.2 G/DL (ref 6.1–8.1)
RBC # BLD AUTO: 4.97 MILLION/UL (ref 3.8–5.1)
SODIUM SERPL-SCNC: 139 MMOL/L (ref 135–146)
THYROGLOB AB SERPL-ACNC: <1 IU/ML
THYROPEROXIDASE AB SERPL-ACNC: 1 IU/ML
TRYPTASE SERPL-MCNC: 5.3 MCG/L
TSH SERPL-ACNC: 0.41 MIU/L
WBC # BLD AUTO: 9.8 THOUSAND/UL (ref 3.8–10.8)

## 2025-07-02 ENCOUNTER — APPOINTMENT (OUTPATIENT)
Dept: BEHAVIORAL HEALTH | Facility: CLINIC | Age: 35
End: 2025-07-02
Payer: COMMERCIAL

## 2025-07-02 DIAGNOSIS — F32.81 PMDD (PREMENSTRUAL DYSPHORIC DISORDER): ICD-10-CM

## 2025-07-02 DIAGNOSIS — F33.41 RECURRENT MAJOR DEPRESSIVE DISORDER, IN PARTIAL REMISSION: ICD-10-CM

## 2025-07-02 DIAGNOSIS — G47.00 INSOMNIA, UNSPECIFIED TYPE: ICD-10-CM

## 2025-07-02 DIAGNOSIS — F41.1 GAD (GENERALIZED ANXIETY DISORDER): ICD-10-CM

## 2025-07-02 PROCEDURE — 99214 OFFICE O/P EST MOD 30 MIN: CPT

## 2025-07-02 RX ORDER — PROPRANOLOL HYDROCHLORIDE 10 MG/1
10 TABLET ORAL DAILY PRN
Qty: 30 TABLET | Refills: 11 | Status: SHIPPED | OUTPATIENT
Start: 2025-07-02 | End: 2026-06-27

## 2025-07-02 RX ORDER — TRAZODONE HYDROCHLORIDE 100 MG/1
100 TABLET ORAL NIGHTLY
Qty: 30 TABLET | Refills: 11 | Status: SHIPPED | OUTPATIENT
Start: 2025-07-02 | End: 2026-06-27

## 2025-07-02 RX ORDER — SERTRALINE HYDROCHLORIDE 100 MG/1
100 TABLET, FILM COATED ORAL DAILY
COMMUNITY
End: 2025-07-02 | Stop reason: SDUPTHER

## 2025-07-02 RX ORDER — PROPRANOLOL HYDROCHLORIDE 10 MG/1
10 TABLET ORAL DAILY PRN
Qty: 30 TABLET | Refills: 11 | Status: SHIPPED | OUTPATIENT
Start: 2025-07-02 | End: 2025-07-02 | Stop reason: SDUPTHER

## 2025-07-02 RX ORDER — HYDROXYZINE PAMOATE 25 MG/1
25 CAPSULE ORAL 3 TIMES DAILY PRN
Qty: 90 CAPSULE | Refills: 3 | Status: SHIPPED | OUTPATIENT
Start: 2025-07-02 | End: 2026-06-27

## 2025-07-02 RX ORDER — TRAZODONE HYDROCHLORIDE 50 MG/1
50-100 TABLET ORAL NIGHTLY
Qty: 60 TABLET | Refills: 11 | Status: SHIPPED | OUTPATIENT
Start: 2025-07-02 | End: 2025-07-02

## 2025-07-02 RX ORDER — ONDANSETRON 4 MG/1
4 TABLET, ORALLY DISINTEGRATING ORAL AS NEEDED
COMMUNITY
Start: 2025-06-28

## 2025-07-02 RX ORDER — SERTRALINE HYDROCHLORIDE 100 MG/1
100 TABLET, FILM COATED ORAL DAILY
Qty: 30 TABLET | Refills: 11 | Status: SHIPPED | OUTPATIENT
Start: 2025-07-02 | End: 2026-06-27

## 2025-07-02 ASSESSMENT — ENCOUNTER SYMPTOMS
PSYCHIATRIC NEGATIVE: 1
CONSTITUTIONAL NEGATIVE: 1

## 2025-07-02 NOTE — PROGRESS NOTES
"Impression : Patient reports improvement in depressive symptoms, CAMERON, PMDD symptoms. Explains that Zoloft 100 mg, Hydroxyzine 25 mg PRN, trazodone 50 mg and propranolol 10 mg PRN  have been  effective at managing symptoms. Reports struggling to fall asleep at night but still getting at least 6-7 hours when she sleeps. BRIAN continued current dose of medications and increased Trazodone to 100 mg nighty.    I performed this visit using real-time telehealth tools, including an AUDIO/VIDEO connection between Ashley Berardinelli who is at Home and TIN Rodriguez who is at At [facility], [place].  Virtual or Telephone Consent    An interactive audio and video telecommunication system which permits real time communications between the patient (at the originating site) and provider (at the distant site) was utilized to provide this telehealth service.   Verbal consent was requested and obtained from Ashley Berardinelli on this date, 07/02/25 for a telehealth visit and the patient's location was confirmed at the time of the visit.      Assessment/Plan     Impression:  Ashley Berardinelli is a 35 y.o. female who presents via telehealth visit for a follow up visit with CC of  \" I feel good. When I was not on the Zoloft I could tell the difference. I know Zoloft it is working for my depression, anxiety and PMDD. \"    Patient consents to treatment.    Problem List Items Addressed This Visit    None  Visit Diagnoses         Codes      Recurrent major depressive disorder, in partial remission     F33.41    Relevant Medications    hydrOXYzine pamoate (VistariL) 25 mg capsule    sertraline (Zoloft) 100 mg tablet    traZODone (Desyrel) 100 mg tablet      CAMERON (generalized anxiety disorder)     F41.1    Relevant Medications    hydrOXYzine pamoate (VistariL) 25 mg capsule    propranolol (Inderal) 10 mg tablet      PMDD (premenstrual dysphoric disorder)     F32.81    Relevant Medications    hydrOXYzine pamoate (VistariL) 25 " mg capsule    sertraline (Zoloft) 100 mg tablet    traZODone (Desyrel) 100 mg tablet      Insomnia, unspecified type     G47.00    Relevant Medications    traZODone (Desyrel) 100 mg tablet            Patient is reminded that if there is SI to call [phone] and get themselves to the closest ED for evaluation, otherwise contact me for other questions/concerns.    Patient presenting to outpatient treatment for a scheduled psych follow up visit.      HPI   Background:   Patient presenting to outpatient treatment for a scheduled psych follow up visit.  Patient is at home for the appointment.  Patient was seen a couple of weeks ago for management of MDD, CAMERON, PMDD.  Patient was started on Celexa. Reports she stopped taking Celexa and went back to Zoloft since it woks better. Reports per her lab results from the Allergist the hives are linked  to low vitamin D. Explains that's why she restarted the Zoloft 100 mg.  Patient reports the medication has helped with anxiety, depression and PMDD.  Patient hopes to keep feeling better.    Characteristics/Recent psychiatric symptoms (pertinent positives and negatives):    Denies depressive symptoms. Denies anxiety and panic attacks. Reports improvement in symptoms. Reports it is hard to fall asleep but she can sleep about 6-7 hours.  Appetite is increased.  Denies wishes for death or consideration for suicide.  CSSRS negative. Denies yajaira of psychosis. Denies hx hospitalization.   Patient does explain that current dose of  Zoloft 100 mg, Hydroxyzine 25 mg PRN, trazodone 50 mg and propranolol 10 mg PRN  have been successful in the management of anxiety, PMDD and depressive symptoms.     -SI/HI : Denies        Psychiatric Review Of Systems:  Depressive Symptoms: negative  Manic Symptoms: negative    Anxiety Symptoms: Negative  Psychotic Symptoms: negative      REVIEW OF SYSTEMS  Review of Systems   Constitutional: Negative.    Psychiatric/Behavioral: Negative.       All other ROS  "negative    Current Medications:  Current Medications[1]     Medical History:  Medical History[2]  Surgical History:  Surgical History[3]  Family History:  Family History[4]  Social History:  Social History     Socioeconomic History    Marital status: Single     Spouse name: Not on file    Number of children: Not on file    Years of education: Not on file    Highest education level: Not on file   Occupational History    Not on file   Tobacco Use    Smoking status: Every Day     Current packs/day: 1.00     Types: Cigarettes     Passive exposure: Current    Smokeless tobacco: Never   Vaping Use    Vaping status: Never Used   Substance and Sexual Activity    Alcohol use: Yes     Comment: socially once month    Drug use: Never    Sexual activity: Defer   Other Topics Concern    Not on file   Social History Narrative    Not on file     Social Drivers of Health     Financial Resource Strain: Not on file   Food Insecurity: No Food Insecurity (6/12/2025)    Hunger Vital Sign     Worried About Running Out of Food in the Last Year: Never true     Ran Out of Food in the Last Year: Never true   Transportation Needs: Not on file   Physical Activity: Not on file   Stress: Not on file   Social Connections: Not on file   Intimate Partner Violence: Not on file   Housing Stability: Not on file     Vitals:  There were no vitals filed for this visit.  Allergies:  RX Allergies[5]    -PCP: TIN Mccormick  -Pt reports currently is not pregnant  -TBI/head trauma/LOC/seizure hx: Denies    Objective   Appearance: Well groomed    Attitude: Calm, cooperative, and engaged in conversation.    Behavior: Appropriate eye contact.     Motor Activity: No psychomotor agitation or retardation. No abnormal movements, tremors or tics. No evidence of extrapyramidal symptoms or tardive dyskinesia.    Speech: Regular rate, rhythm, volume. Spontaneous, no pressured speech.    Mood:  \" It is good \"    Affect: Full range, mood congruent.    Thought " Process: Linear, logical, and goal-directed. No loose associations or gross thought disorganization.    Thought Content:  Denied current suicidal ideation or thoughts of harm to self, denied homicidal ideation or thoughts of harm to others. No delusional thinking elicited. No perseverations or obsessions identified.     Perception: Did not endorse auditory or visual hallucinations, did not appear to be responding to hallucinatory stimuli.     Cognition: Alert, oriented x3. Preserved attention span and concentration, recent and remote memory. Adequate fund of knowledge. No deficits in language.     Insight: Fair, in regards to understanding mental health condition    Judgement: Fair        Physical Exam      -Diagnoses and all orders for this visit:  Recurrent major depressive disorder, in partial remission  -     Follow Up In Psychiatry  -     sertraline (Zoloft) 100 mg tablet; Take 1 tablet (100 mg) by mouth once daily.  CAMERON (generalized anxiety disorder)  -     Follow Up In Psychiatry  -     hydrOXYzine pamoate (VistariL) 25 mg capsule; Take 1 capsule (25 mg) by mouth 3 times a day as needed for anxiety.  -     propranolol (Inderal) 10 mg tablet; Take 1 tablet (10 mg) by mouth once daily as needed (Anxiety attacks).  PMDD (premenstrual dysphoric disorder)  -     Follow Up In Psychiatry  Insomnia, unspecified type  -     Follow Up In Psychiatry  -     Discontinue: traZODone (Desyrel) 50 mg tablet; Take 1-2 tablets ( mg) by mouth once daily at bedtime.  -     traZODone (Desyrel) 100 mg tablet; Take 1 tablet (100 mg) by mouth once daily at bedtime.         MEDICAL-DECISION MAKING    -Patient educated and verbalized understanding on benefits, risks, and side effects of current psychiatric medications.     Psych med regimen as follows:  -INCREASE Trazodone to 100 mg nightly for sleep  -CONTINUE current psychiatric medications as prescribed : Zoloft 100 mg for depression, PMDD, Hydroxyzine 25 mg PRN for anxiety,  and propranolol 10 mg PRN for CAMERON  -CONTINUE Psychotherapy  -CONTINUE exercising and eating heathy diet  -Safety plan reviewed  -READ attached information about the prescribed new medication   -CALL OFFICE IN CASE OF SIDE EFFECT TO SCHEDULE A VISIT FOR ASSESSMENT -[phone]    SI/HI ASSESSMENT  -Patient denied current suicidal ideation or thoughts of harm to self, denied homicidal ideation or thoughts of harm to others. No delusional thinking elicited. No perseverations or obsessions identified.     PLAN  -Continue Medications as directed.  -Follow-up with   -Risks/benefits/assessment of medication interventions discussed with pt; pt agreeable to plan. Will continue to monitor for symptoms mgmt and SEs and adjust plan as needed.  -MI to increase coping skills/behavior regulation.  -Safety plan reviewed.  -Call  Psychiatry at [phone] with issues.  -For [facility] residents, [employer] is a 24/7 hotline you can call for assistance at [phone]. Please call 911 or go to your closest Emergency Room if you feel worse. This includes thoughts of hurting yourself or anyone else, or having other troubles such as hearing voices, seeing visions, or having new and scary thoughts about the people around you.    OARRS:  TIN Rodriguez on 7/2/2025  8:58 AM  I have personally reviewed the OARRS report for Ashley Berardinelli. I have considered the risks of abuse, dependence, addiction and diversion  Medication is felt to be clinically appropriate based on documented diagnosis.      TIN Rodriguez  Record Review: extensive   CALL OFFICE IN CASE OF SIDE EFFECT TO SCHEDULE A VISIT FOR ASSESSMENT -[phone]  Follow up:   With   Time Spent:  Prep:   Direct time: 24 minutes  Documentation: 4  minutes  Total: 28 minutes         [1]   Current Outpatient Medications:     ascorbic acid (Vitamin C) 500 mg tablet, Take 1 tablet (500 mg) by mouth once daily., Disp: 90 tablet, Rfl: 11    b  complex vitamins capsule, Take 1 capsule by mouth 2 times a day., Disp: 60 capsule, Rfl: 11    cetirizine (ZyrTEC) 10 mg capsule, 1-2 pills every 12 hours as needed, Disp: 180 capsule, Rfl: 3    esomeprazole (NexIUM) 20 mg DR capsule, Take 1 capsule (20 mg) by mouth once daily in the morning. Take before meals. Do not open capsule., Disp: 90 capsule, Rfl: 3    famotidine (Pepcid) 20 mg tablet, Take 1 tablet (20 mg) by mouth 2 times a day., Disp: 180 tablet, Rfl: 3    gabapentin (Neurontin) 300 mg capsule, Take 1 capsule (300 mg) by mouth once daily at bedtime for 5 days, THEN 1 capsule (300 mg) 2 times a day for 5 days, THEN 1 capsule (300 mg) 3 times a day., Disp: 90 capsule, Rfl: 3    hydrOXYzine HCL (Atarax) 25 mg tablet, Take 1 tablet (25 mg) by mouth 3 times a day as needed for anxiety., Disp: 270 tablet, Rfl: 0    ibuprofen 800 mg tablet, Take 1 tablet (800 mg) by mouth every 8 hours if needed for mild pain (1 - 3)., Disp: , Rfl:     metFORMIN (Glucophage) 500 mg tablet, Take 1 tablet (500 mg) by mouth 2 times daily (morning and late afternoon)., Disp: , Rfl:     nitrofurantoin (Macrodantin) 100 mg capsule, Take by mouth once daily at bedtime., Disp: , Rfl:     ondansetron ODT (Zofran-ODT) 4 mg disintegrating tablet, Dissolve 1 tablet (4 mg) in the mouth if needed for vomiting or nausea., Disp: , Rfl:     valACYclovir (Valtrex) 500 mg tablet, Take 1 tablet (500 mg) by mouth once daily., Disp: 90 tablet, Rfl: 3    Ventolin HFA 90 mcg/actuation inhaler, Inhale., Disp: , Rfl:     alpha lipoic acid 600 mg capsule, Take 600 mg by mouth 3 times a day after meals. (Patient not taking: Reported on 7/2/2025), Disp: 90 capsule, Rfl: 11    cyclobenzaprine (Flexeril) 10 mg tablet, Take 1 tablet (10 mg) by mouth 3 times a day as needed for muscle spasms. (Patient not taking: Reported on 7/2/2025), Disp: 30 tablet, Rfl: 0    hydrOXYzine pamoate (VistariL) 25 mg capsule, Take 1 capsule (25 mg) by mouth 3 times a day as  needed for anxiety., Disp: 90 capsule, Rfl: 3    propranolol (Inderal) 10 mg tablet, Take 1 tablet (10 mg) by mouth once daily as needed (Anxiety attacks)., Disp: 30 tablet, Rfl: 11    sertraline (Zoloft) 100 mg tablet, Take 1 tablet (100 mg) by mouth once daily., Disp: 30 tablet, Rfl: 11    traZODone (Desyrel) 100 mg tablet, Take 1 tablet (100 mg) by mouth once daily at bedtime., Disp: 30 tablet, Rfl: 11  [2]   Past Medical History:  Diagnosis Date    Anxiety     Chronic pain disorder     Depression 2003    Difficulty in walking, not elsewhere classified 11/16/2020    Difficulty walking    Lumbago with sciatica, unspecified side 11/16/2020    Lumbago with sciatica    Obesity, unspecified 12/15/2013    Obesity    Panic attack 2020    Personal history of other diseases of the nervous system and sense organs     History of chronic fatigue syndrome    Personal history of other infectious and parasitic diseases 07/05/2017    History of herpes zoster    Personal history of other specified conditions 07/21/2020    History of syncope    Sleep difficulties 2022    Suicide attempt (Multi) 2004   [3]   Past Surgical History:  Procedure Laterality Date    MANDIBLE SURGERY  10/12/2015    Jaw Surgery    TONSILLECTOMY  10/12/2015    Tonsillectomy With Adenoidectomy   [4]   Family History  Problem Relation Name Age of Onset    Alcohol abuse Maternal Grandfather Kd Wynne     Dementia Maternal Grandmother Annmarie Wynne     Depression Sister Candy Oliver     Self-Injury Sister [facility]     Suicide Attempts Sister [facility]     Depression Sister Candy Oliver     Self-Injury Sister Candy Oliver     Suicide Attempts Sister [facility]     Depression Sister Candy Olvier     Self-Injury Sister Candy Oliver     Suicide Attempts Sister [facility]    [5]   Allergies  Allergen Reactions    Augmentin [Amoxicillin-Pot Clavulanate] Wheezing    Sucralfate Hives, Rash and Unknown

## 2025-07-07 LAB
25(OH)D3+25(OH)D2 SERPL-MCNC: 36 NG/ML (ref 30–100)
ALBUMIN SERPL-MCNC: 4.6 G/DL (ref 3.6–5.1)
ALP SERPL-CCNC: 107 U/L (ref 31–125)
ALT SERPL-CCNC: 31 U/L (ref 6–29)
ANA SER QL IF: NEGATIVE
ANION GAP SERPL CALCULATED.4IONS-SCNC: 10 MMOL/L (CALC) (ref 7–17)
AST SERPL-CCNC: 24 U/L (ref 10–30)
BASOPHILS # BLD AUTO: 118 CELLS/UL (ref 0–200)
BASOPHILS NFR BLD AUTO: 1.2 %
BILIRUB SERPL-MCNC: 0.4 MG/DL (ref 0.2–1.2)
BUN SERPL-MCNC: 12 MG/DL (ref 7–25)
CALCIUM SERPL-MCNC: 9.5 MG/DL (ref 8.6–10.2)
CHLORIDE SERPL-SCNC: 106 MMOL/L (ref 98–110)
CO2 SERPL-SCNC: 23 MMOL/L (ref 20–32)
CREAT SERPL-MCNC: 0.68 MG/DL (ref 0.5–0.97)
EGFRCR SERPLBLD CKD-EPI 2021: 115 ML/MIN/1.73M2
EOSINOPHIL # BLD AUTO: 235 CELLS/UL (ref 15–500)
EOSINOPHIL NFR BLD AUTO: 2.4 %
ERYTHROCYTE [DISTWIDTH] IN BLOOD BY AUTOMATED COUNT: 12.6 % (ref 11–15)
ERYTHROCYTE [SEDIMENTATION RATE] IN BLOOD BY WESTERGREN METHOD: 6 MM/H
FC EPSILON RI + RII AB SER-ACNC: <16 %
GLUCOSE SERPL-MCNC: 104 MG/DL (ref 65–99)
HCT VFR BLD AUTO: 44.8 % (ref 35–45)
HGB BLD-MCNC: 15 G/DL (ref 11.7–15.5)
IGE SERPL-ACNC: 216 KU/L
LYMPHOCYTES # BLD AUTO: 2009 CELLS/UL (ref 850–3900)
LYMPHOCYTES NFR BLD AUTO: 20.5 %
MCH RBC QN AUTO: 30.2 PG (ref 27–33)
MCHC RBC AUTO-ENTMCNC: 33.5 G/DL (ref 32–36)
MCV RBC AUTO: 90.1 FL (ref 80–100)
MONOCYTES # BLD AUTO: 529 CELLS/UL (ref 200–950)
MONOCYTES NFR BLD AUTO: 5.4 %
NEUTROPHILS # BLD AUTO: 6909 CELLS/UL (ref 1500–7800)
NEUTROPHILS NFR BLD AUTO: 70.5 %
PLATELET # BLD AUTO: 307 THOUSAND/UL (ref 140–400)
PMV BLD REES-ECKER: 12 FL (ref 7.5–12.5)
POTASSIUM SERPL-SCNC: 4.2 MMOL/L (ref 3.5–5.3)
PROT SERPL-MCNC: 7.2 G/DL (ref 6.1–8.1)
RBC # BLD AUTO: 4.97 MILLION/UL (ref 3.8–5.1)
SODIUM SERPL-SCNC: 139 MMOL/L (ref 135–146)
THYROGLOB AB SERPL-ACNC: <1 IU/ML
THYROPEROXIDASE AB SERPL-ACNC: 1 IU/ML
TRYPTASE SERPL-MCNC: 5.3 MCG/L
TSH SERPL-ACNC: 0.41 MIU/L
WBC # BLD AUTO: 9.8 THOUSAND/UL (ref 3.8–10.8)

## 2025-07-17 DIAGNOSIS — M79.7 FIBROMYALGIA: Primary | ICD-10-CM

## 2025-07-17 DIAGNOSIS — G90.521 COMPLEX REGIONAL PAIN SYNDROME TYPE 1 OF RIGHT LOWER EXTREMITY: ICD-10-CM

## 2025-07-17 RX ORDER — EPINEPHRINE 1 MG/ML
0.3 INJECTION, SOLUTION, CONCENTRATE INTRAVENOUS EVERY 5 MIN PRN
OUTPATIENT
Start: 2025-07-23

## 2025-07-17 RX ORDER — FAMOTIDINE 10 MG/ML
20 INJECTION, SOLUTION INTRAVENOUS ONCE AS NEEDED
OUTPATIENT
Start: 2025-07-23

## 2025-07-17 RX ORDER — DIPHENHYDRAMINE HYDROCHLORIDE 50 MG/ML
50 INJECTION, SOLUTION INTRAMUSCULAR; INTRAVENOUS AS NEEDED
OUTPATIENT
Start: 2025-07-23

## 2025-07-17 RX ORDER — KETOROLAC TROMETHAMINE 30 MG/ML
30 INJECTION, SOLUTION INTRAMUSCULAR; INTRAVENOUS ONCE
OUTPATIENT
Start: 2025-07-23 | End: 2025-07-23

## 2025-07-17 RX ORDER — ALBUTEROL SULFATE 0.83 MG/ML
3 SOLUTION RESPIRATORY (INHALATION) AS NEEDED
OUTPATIENT
Start: 2025-07-23

## 2025-07-23 ENCOUNTER — APPOINTMENT (OUTPATIENT)
Dept: INFUSION THERAPY | Facility: CLINIC | Age: 35
End: 2025-07-23
Payer: COMMERCIAL

## 2025-08-01 DIAGNOSIS — G90.521 COMPLEX REGIONAL PAIN SYNDROME TYPE 1 OF RIGHT LOWER EXTREMITY: ICD-10-CM

## 2025-08-01 DIAGNOSIS — M79.7 FIBROMYALGIA: Primary | ICD-10-CM

## 2025-08-01 RX ORDER — KETOROLAC TROMETHAMINE 30 MG/ML
30 INJECTION, SOLUTION INTRAMUSCULAR; INTRAVENOUS ONCE
OUTPATIENT
Start: 2025-08-07 | End: 2025-08-07

## 2025-08-06 ENCOUNTER — APPOINTMENT (OUTPATIENT)
Dept: RHEUMATOLOGY | Facility: CLINIC | Age: 35
End: 2025-08-06
Payer: COMMERCIAL

## 2025-08-14 ENCOUNTER — APPOINTMENT (OUTPATIENT)
Facility: CLINIC | Age: 35
End: 2025-08-14
Payer: COMMERCIAL

## 2025-08-19 ENCOUNTER — HOSPITAL ENCOUNTER (EMERGENCY)
Facility: HOSPITAL | Age: 35
Discharge: HOME | End: 2025-08-20
Payer: COMMERCIAL

## 2025-08-19 DIAGNOSIS — N30.00 ACUTE CYSTITIS WITHOUT HEMATURIA: Primary | ICD-10-CM

## 2025-08-19 LAB
APPEARANCE UR: CLEAR
BILIRUB UR STRIP.AUTO-MCNC: NEGATIVE MG/DL
COLOR UR: YELLOW
GLUCOSE UR STRIP.AUTO-MCNC: NORMAL MG/DL
HCG UR QL IA.RAPID: NEGATIVE
KETONES UR STRIP.AUTO-MCNC: NEGATIVE MG/DL
LEUKOCYTE ESTERASE UR QL STRIP.AUTO: ABNORMAL
NITRITE UR QL STRIP.AUTO: NEGATIVE
PH UR STRIP.AUTO: 6.5 [PH]
PROT UR STRIP.AUTO-MCNC: NEGATIVE MG/DL
RBC # UR STRIP.AUTO: ABNORMAL MG/DL
RBC #/AREA URNS AUTO: ABNORMAL /HPF
SP GR UR STRIP.AUTO: 1
UROBILINOGEN UR STRIP.AUTO-MCNC: NORMAL MG/DL
WBC #/AREA URNS AUTO: ABNORMAL /HPF
WBC CLUMPS #/AREA URNS AUTO: ABNORMAL /HPF

## 2025-08-19 PROCEDURE — 81025 URINE PREGNANCY TEST: CPT | Performed by: NURSE PRACTITIONER

## 2025-08-19 PROCEDURE — 81001 URINALYSIS AUTO W/SCOPE: CPT | Performed by: EMERGENCY MEDICINE

## 2025-08-19 PROCEDURE — 87086 URINE CULTURE/COLONY COUNT: CPT | Mod: PARLAB | Performed by: EMERGENCY MEDICINE

## 2025-08-19 PROCEDURE — 99284 EMERGENCY DEPT VISIT MOD MDM: CPT

## 2025-08-19 RX ORDER — KETOROLAC TROMETHAMINE 30 MG/ML
15 INJECTION, SOLUTION INTRAMUSCULAR; INTRAVENOUS ONCE
Status: COMPLETED | OUTPATIENT
Start: 2025-08-19 | End: 2025-08-20

## 2025-08-19 ASSESSMENT — PAIN DESCRIPTION - PROGRESSION: CLINICAL_PROGRESSION: GRADUALLY WORSENING

## 2025-08-19 ASSESSMENT — PAIN DESCRIPTION - ORIENTATION: ORIENTATION: ANTERIOR

## 2025-08-19 ASSESSMENT — PAIN DESCRIPTION - ONSET: ONSET: GRADUAL

## 2025-08-19 ASSESSMENT — PAIN - FUNCTIONAL ASSESSMENT: PAIN_FUNCTIONAL_ASSESSMENT: 0-10

## 2025-08-19 ASSESSMENT — PAIN DESCRIPTION - FREQUENCY: FREQUENCY: CONSTANT/CONTINUOUS

## 2025-08-19 ASSESSMENT — PAIN DESCRIPTION - PAIN TYPE: TYPE: ACUTE PAIN

## 2025-08-19 ASSESSMENT — PAIN DESCRIPTION - LOCATION: LOCATION: PELVIS

## 2025-08-19 ASSESSMENT — PAIN DESCRIPTION - DESCRIPTORS: DESCRIPTORS: BURNING

## 2025-08-19 ASSESSMENT — PAIN SCALES - GENERAL: PAINLEVEL_OUTOF10: 9

## 2025-08-20 VITALS
RESPIRATION RATE: 15 BRPM | DIASTOLIC BLOOD PRESSURE: 61 MMHG | WEIGHT: 232 LBS | TEMPERATURE: 97.5 F | OXYGEN SATURATION: 95 % | HEIGHT: 64 IN | HEART RATE: 69 BPM | SYSTOLIC BLOOD PRESSURE: 126 MMHG | BODY MASS INDEX: 39.61 KG/M2

## 2025-08-20 LAB
ALBUMIN SERPL BCP-MCNC: 4.4 G/DL (ref 3.4–5)
ALP SERPL-CCNC: 64 U/L (ref 33–110)
ALT SERPL W P-5'-P-CCNC: 16 U/L (ref 7–45)
ANION GAP SERPL CALC-SCNC: 13 MMOL/L (ref 10–20)
AST SERPL W P-5'-P-CCNC: 22 U/L (ref 9–39)
BASOPHILS # BLD AUTO: 0.12 X10*3/UL (ref 0–0.1)
BASOPHILS NFR BLD AUTO: 0.8 %
BILIRUB SERPL-MCNC: 0.4 MG/DL (ref 0–1.2)
BUN SERPL-MCNC: 8 MG/DL (ref 6–23)
CALCIUM SERPL-MCNC: 9.1 MG/DL (ref 8.6–10.3)
CHLORIDE SERPL-SCNC: 106 MMOL/L (ref 98–107)
CO2 SERPL-SCNC: 23 MMOL/L (ref 21–32)
CREAT SERPL-MCNC: 0.79 MG/DL (ref 0.5–1.05)
EGFRCR SERPLBLD CKD-EPI 2021: >90 ML/MIN/1.73M*2
EOSINOPHIL # BLD AUTO: 0.22 X10*3/UL (ref 0–0.7)
EOSINOPHIL NFR BLD AUTO: 1.5 %
ERYTHROCYTE [DISTWIDTH] IN BLOOD BY AUTOMATED COUNT: 13.4 % (ref 11.5–14.5)
GLUCOSE SERPL-MCNC: 80 MG/DL (ref 74–99)
HCT VFR BLD AUTO: 44.8 % (ref 36–46)
HGB BLD-MCNC: 14.2 G/DL (ref 12–16)
HOLD SPECIMEN: NORMAL
IMM GRANULOCYTES # BLD AUTO: 0.05 X10*3/UL (ref 0–0.7)
IMM GRANULOCYTES NFR BLD AUTO: 0.3 % (ref 0–0.9)
LYMPHOCYTES # BLD AUTO: 3.11 X10*3/UL (ref 1.2–4.8)
LYMPHOCYTES NFR BLD AUTO: 21.2 %
MAGNESIUM SERPL-MCNC: 1.84 MG/DL (ref 1.6–2.4)
MCH RBC QN AUTO: 29.8 PG (ref 26–34)
MCHC RBC AUTO-ENTMCNC: 31.7 G/DL (ref 32–36)
MCV RBC AUTO: 94 FL (ref 80–100)
MONOCYTES # BLD AUTO: 0.8 X10*3/UL (ref 0.1–1)
MONOCYTES NFR BLD AUTO: 5.5 %
NEUTROPHILS # BLD AUTO: 10.35 X10*3/UL (ref 1.2–7.7)
NEUTROPHILS NFR BLD AUTO: 70.7 %
NRBC BLD-RTO: 0 /100 WBCS (ref 0–0)
PLATELET # BLD AUTO: 247 X10*3/UL (ref 150–450)
POTASSIUM SERPL-SCNC: 4 MMOL/L (ref 3.5–5.3)
PROT SERPL-MCNC: 7.3 G/DL (ref 6.4–8.2)
RBC # BLD AUTO: 4.76 X10*6/UL (ref 4–5.2)
SODIUM SERPL-SCNC: 138 MMOL/L (ref 136–145)
WBC # BLD AUTO: 14.7 X10*3/UL (ref 4.4–11.3)

## 2025-08-20 PROCEDURE — 96361 HYDRATE IV INFUSION ADD-ON: CPT

## 2025-08-20 PROCEDURE — 83735 ASSAY OF MAGNESIUM: CPT | Performed by: NURSE PRACTITIONER

## 2025-08-20 PROCEDURE — 96375 TX/PRO/DX INJ NEW DRUG ADDON: CPT

## 2025-08-20 PROCEDURE — 96365 THER/PROPH/DIAG IV INF INIT: CPT

## 2025-08-20 PROCEDURE — 2500000004 HC RX 250 GENERAL PHARMACY W/ HCPCS (ALT 636 FOR OP/ED): Performed by: NURSE PRACTITIONER

## 2025-08-20 PROCEDURE — 36415 COLL VENOUS BLD VENIPUNCTURE: CPT | Performed by: NURSE PRACTITIONER

## 2025-08-20 PROCEDURE — 85025 COMPLETE CBC W/AUTO DIFF WBC: CPT | Performed by: NURSE PRACTITIONER

## 2025-08-20 PROCEDURE — 80053 COMPREHEN METABOLIC PANEL: CPT | Performed by: NURSE PRACTITIONER

## 2025-08-20 RX ORDER — CEFTRIAXONE 1 G/50ML
1 INJECTION, SOLUTION INTRAVENOUS ONCE
Status: COMPLETED | OUTPATIENT
Start: 2025-08-20 | End: 2025-08-20

## 2025-08-20 RX ORDER — CEPHALEXIN 500 MG/1
500 CAPSULE ORAL 2 TIMES DAILY
Qty: 14 CAPSULE | Refills: 0 | Status: SHIPPED | OUTPATIENT
Start: 2025-08-20 | End: 2025-08-27

## 2025-08-20 RX ADMIN — CEFTRIAXONE 1 G: 1 INJECTION, SOLUTION INTRAVENOUS at 01:37

## 2025-08-20 RX ADMIN — SODIUM CHLORIDE 1000 ML: 0.9 INJECTION, SOLUTION INTRAVENOUS at 00:11

## 2025-08-20 RX ADMIN — KETOROLAC TROMETHAMINE 15 MG: 30 INJECTION, SOLUTION INTRAMUSCULAR at 00:11

## 2025-08-20 ASSESSMENT — PAIN DESCRIPTION - LOCATION
LOCATION: OTHER (COMMENT)
LOCATION: ABDOMEN

## 2025-08-20 ASSESSMENT — PAIN DESCRIPTION - ORIENTATION
ORIENTATION: LOWER
ORIENTATION: LOWER

## 2025-08-20 ASSESSMENT — PAIN DESCRIPTION - PAIN TYPE: TYPE: ACUTE PAIN

## 2025-08-20 ASSESSMENT — PAIN DESCRIPTION - DESCRIPTORS
DESCRIPTORS: ACHING;CRAMPING
DESCRIPTORS: BURNING

## 2025-08-20 ASSESSMENT — PAIN SCALES - GENERAL
PAINLEVEL_OUTOF10: 5 - MODERATE PAIN
PAINLEVEL_OUTOF10: 7
PAINLEVEL_OUTOF10: 7

## 2025-08-20 ASSESSMENT — PAIN DESCRIPTION - ONSET: ONSET: ONGOING

## 2025-08-20 ASSESSMENT — PAIN - FUNCTIONAL ASSESSMENT: PAIN_FUNCTIONAL_ASSESSMENT: 0-10

## 2025-08-21 ENCOUNTER — PATIENT OUTREACH (OUTPATIENT)
Dept: CARE COORDINATION | Facility: CLINIC | Age: 35
End: 2025-08-21
Payer: COMMERCIAL

## 2025-08-21 LAB — BACTERIA UR CULT: NO GROWTH

## 2025-08-27 ENCOUNTER — TELEPHONE (OUTPATIENT)
Dept: DERMATOLOGY | Facility: CLINIC | Age: 35
End: 2025-08-27

## 2025-08-27 ENCOUNTER — APPOINTMENT (OUTPATIENT)
Dept: ENDOCRINOLOGY | Facility: CLINIC | Age: 35
End: 2025-08-27
Payer: COMMERCIAL

## 2025-08-27 DIAGNOSIS — B34.8 OTHER VIRAL INFECTIONS OF UNSPECIFIED SITE: ICD-10-CM

## 2025-08-27 RX ORDER — VALACYCLOVIR HYDROCHLORIDE 500 MG/1
500 TABLET, FILM COATED ORAL DAILY
Qty: 90 TABLET | Refills: 3 | Status: CANCELLED | OUTPATIENT
Start: 2025-08-27

## 2025-08-28 ENCOUNTER — OFFICE VISIT (OUTPATIENT)
Dept: URGENT CARE | Age: 35
End: 2025-08-28
Payer: COMMERCIAL

## 2025-08-28 ENCOUNTER — TELEPHONE (OUTPATIENT)
Dept: DERMATOLOGY | Facility: CLINIC | Age: 35
End: 2025-08-28
Payer: COMMERCIAL

## 2025-08-28 VITALS
HEART RATE: 91 BPM | TEMPERATURE: 98.2 F | BODY MASS INDEX: 39.09 KG/M2 | SYSTOLIC BLOOD PRESSURE: 130 MMHG | HEIGHT: 64 IN | RESPIRATION RATE: 17 BRPM | OXYGEN SATURATION: 98 % | WEIGHT: 229 LBS | DIASTOLIC BLOOD PRESSURE: 89 MMHG

## 2025-08-28 DIAGNOSIS — R35.0 URINE FREQUENCY: ICD-10-CM

## 2025-08-28 DIAGNOSIS — B00.9 HSV-2 (HERPES SIMPLEX VIRUS 2) INFECTION: Primary | ICD-10-CM

## 2025-08-28 LAB
POC APPEARANCE, URINE: ABNORMAL
POC BILIRUBIN, URINE: NEGATIVE
POC BLOOD, URINE: NEGATIVE
POC COLOR, URINE: YELLOW
POC GLUCOSE, URINE: NEGATIVE MG/DL
POC KETONES, URINE: ABNORMAL MG/DL
POC LEUKOCYTES, URINE: NEGATIVE
POC NITRITE,URINE: NEGATIVE
POC PH, URINE: 6 PH
POC PROTEIN, URINE: NEGATIVE MG/DL
POC SPECIFIC GRAVITY, URINE: 1.02
POC UROBILINOGEN, URINE: 0.2 EU/DL
PREGNANCY TEST URINE, POC: NEGATIVE

## 2025-08-28 RX ORDER — VALACYCLOVIR HYDROCHLORIDE 500 MG/1
500 TABLET, FILM COATED ORAL 2 TIMES DAILY
Qty: 6 TABLET | Refills: 3 | Status: SHIPPED | OUTPATIENT
Start: 2025-08-28 | End: 2025-08-31

## 2025-08-28 ASSESSMENT — PATIENT HEALTH QUESTIONNAIRE - PHQ9
1. LITTLE INTEREST OR PLEASURE IN DOING THINGS: NOT AT ALL
SUM OF ALL RESPONSES TO PHQ9 QUESTIONS 1 AND 2: 0
2. FEELING DOWN, DEPRESSED OR HOPELESS: NOT AT ALL

## 2025-09-02 ENCOUNTER — APPOINTMENT (OUTPATIENT)
Dept: ENDOCRINOLOGY | Facility: CLINIC | Age: 35
End: 2025-09-02
Payer: COMMERCIAL

## 2025-09-02 PROBLEM — E66.812 OBESITY, CLASS 2: Status: ACTIVE | Noted: 2025-09-02

## 2025-09-02 ASSESSMENT — ENCOUNTER SYMPTOMS
EYE PAIN: 0
PHOTOPHOBIA: 0
FATIGUE: 0
NUMBNESS: 0
CONSTIPATION: 0
COUGH: 0
EYE DISCHARGE: 0
RHINORRHEA: 0
FREQUENCY: 0
HEADACHES: 0
OCCASIONAL FEELINGS OF UNSTEADINESS: 0
BACK PAIN: 0
DEPRESSION: 0
LOSS OF SENSATION IN FEET: 0
FLANK PAIN: 0
ACTIVITY CHANGE: 0
SPEECH DIFFICULTY: 0
ABDOMINAL DISTENTION: 0
DIARRHEA: 0
POLYPHAGIA: 0
POLYDIPSIA: 0
DIZZINESS: 0

## 2025-09-02 ASSESSMENT — PATIENT HEALTH QUESTIONNAIRE - PHQ9
SUM OF ALL RESPONSES TO PHQ9 QUESTIONS 1 AND 2: 0
1. LITTLE INTEREST OR PLEASURE IN DOING THINGS: NOT AT ALL
2. FEELING DOWN, DEPRESSED OR HOPELESS: NOT AT ALL

## 2025-09-04 ENCOUNTER — APPOINTMENT (OUTPATIENT)
Facility: CLINIC | Age: 35
End: 2025-09-04
Payer: COMMERCIAL

## 2025-09-04 ASSESSMENT — ENCOUNTER SYMPTOMS
OCCASIONAL FEELINGS OF UNSTEADINESS: 0
LOSS OF SENSATION IN FEET: 0
DEPRESSION: 0

## 2025-09-04 ASSESSMENT — ANXIETY QUESTIONNAIRES
3. WORRYING TOO MUCH ABOUT DIFFERENT THINGS: NOT AT ALL
6. BECOMING EASILY ANNOYED OR IRRITABLE: NOT AT ALL
1. FEELING NERVOUS, ANXIOUS, OR ON EDGE: NOT AT ALL
4. TROUBLE RELAXING: NOT AT ALL
5. BEING SO RESTLESS THAT IT IS HARD TO SIT STILL: NOT AT ALL
2. NOT BEING ABLE TO STOP OR CONTROL WORRYING: NOT AT ALL
7. FEELING AFRAID AS IF SOMETHING AWFUL MIGHT HAPPEN: NOT AT ALL
GAD7 TOTAL SCORE: 0

## 2025-09-08 ENCOUNTER — APPOINTMENT (OUTPATIENT)
Dept: DERMATOLOGY | Facility: CLINIC | Age: 35
End: 2025-09-08
Payer: COMMERCIAL

## 2025-09-30 ENCOUNTER — APPOINTMENT (OUTPATIENT)
Dept: UROLOGY | Facility: CLINIC | Age: 35
End: 2025-09-30
Payer: COMMERCIAL

## 2025-10-02 ENCOUNTER — APPOINTMENT (OUTPATIENT)
Dept: NUTRITION | Facility: CLINIC | Age: 35
End: 2025-10-02
Payer: COMMERCIAL